# Patient Record
Sex: MALE | Race: BLACK OR AFRICAN AMERICAN | Employment: FULL TIME | URBAN - METROPOLITAN AREA
[De-identification: names, ages, dates, MRNs, and addresses within clinical notes are randomized per-mention and may not be internally consistent; named-entity substitution may affect disease eponyms.]

---

## 2018-01-22 ENCOUNTER — HOSPITAL ENCOUNTER (EMERGENCY)
Age: 23
Discharge: HOME | End: 2018-01-22
Attending: EMERGENCY MEDICINE
Payer: COMMERCIAL

## 2018-01-22 VITALS
TEMPERATURE: 99 F | BODY MASS INDEX: 25.18 KG/M2 | OXYGEN SATURATION: 97 % | RESPIRATION RATE: 16 BRPM | HEART RATE: 89 BPM | SYSTOLIC BLOOD PRESSURE: 139 MMHG | WEIGHT: 190 LBS | DIASTOLIC BLOOD PRESSURE: 79 MMHG | HEIGHT: 73 IN

## 2018-01-22 DIAGNOSIS — S01.81XA FACIAL LACERATION, INITIAL ENCOUNTER: Primary | ICD-10-CM

## 2018-01-22 PROCEDURE — 99283 EMERGENCY DEPT VISIT LOW MDM: CPT

## 2018-01-22 PROCEDURE — 12011 RPR F/E/E/N/L/M 2.5 CM/<: CPT

## 2018-01-22 RX ORDER — MIRTAZAPINE 30 MG/1
30 TABLET, FILM COATED ORAL NIGHTLY
COMMUNITY

## 2018-01-22 ASSESSMENT — PAIN SCALES - GENERAL: PAINLEVEL_OUTOF10: 8
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
## 2020-04-29 ENCOUNTER — TELEPHONE (OUTPATIENT)
Dept: ORTHOPEDIC SURGERY | Age: 25
End: 2020-04-29

## 2020-04-29 NOTE — TELEPHONE ENCOUNTER
Per Dr. Aguilar Pedraza the patient should come into the office on Monday 5/4 for cast removal and x-rays. Tried to call the patient and the patient's mother and got VM. Did call  manuel at [phone] and left a message for her to call me back to schedule an appointment.

## 2020-05-27 ENCOUNTER — HOSPITAL ENCOUNTER (OUTPATIENT)
Age: 25
Setting detail: SPECIMEN
Discharge: HOME | End: 2020-05-27
Payer: MEDICAID

## 2020-05-27 LAB
ABSOLUTE EOS #: 0.29 K/UL (ref 0–0.44)
ABSOLUTE IMMATURE GRANULOCYTE: 0.04 K/UL (ref 0–0.3)
ABSOLUTE LYMPH #: 1.49 K/UL (ref 1.1–3.7)
ABSOLUTE MONO #: 0.76 K/UL (ref 0.1–1.2)
ALBUMIN SERPL-MCNC: 4.4 G/DL (ref 3.5–5.2)
ALBUMIN/GLOBULIN RATIO: 1.4 (ref 1–2.5)
ALP BLD-CCNC: 72 U/L (ref 40–129)
ALT SERPL-CCNC: 36 U/L (ref 5–41)
ANION GAP SERPL CALCULATED.3IONS-SCNC: 22 MMOL/L (ref 9–17)
AST SERPL-CCNC: 23 U/L
BASOPHILS # BLD: 1 % (ref 0–2)
BASOPHILS ABSOLUTE: 0.04 K/UL (ref 0–0.2)
BILIRUB SERPL-MCNC: <0.1 MG/DL (ref 0.3–1.2)
BUN BLDV-MCNC: 10 MG/DL (ref 6–20)
BUN/CREAT BLD: ABNORMAL (ref 9–20)
CALCIUM SERPL-MCNC: 9.5 MG/DL (ref 8.6–10.4)
CHLORIDE BLD-SCNC: 104 MMOL/L (ref 98–107)
CHOLESTEROL/HDL RATIO: 4.4
CHOLESTEROL: 232 MG/DL
CO2: 19 MMOL/L (ref 20–31)
CREAT SERPL-MCNC: 0.79 MG/DL (ref 0.7–1.2)
DIFFERENTIAL TYPE: ABNORMAL
EOSINOPHILS RELATIVE PERCENT: 5 % (ref 1–4)
ESTIMATED AVERAGE GLUCOSE: 134 MG/DL
GFR AFRICAN AMERICAN: >60 ML/MIN
GFR NON-AFRICAN AMERICAN: >60 ML/MIN
GFR SERPL CREATININE-BSD FRML MDRD: ABNORMAL ML/MIN/{1.73_M2}
GFR SERPL CREATININE-BSD FRML MDRD: ABNORMAL ML/MIN/{1.73_M2}
GLUCOSE BLD-MCNC: 87 MG/DL (ref 70–99)
HAV IGM SER IA-ACNC: NONREACTIVE
HBA1C MFR BLD: 6.3 % (ref 4–6)
HCT VFR BLD CALC: 48.3 % (ref 40.7–50.3)
HDLC SERPL-MCNC: 53 MG/DL
HEMOGLOBIN: 15.2 G/DL (ref 13–17)
HEPATITIS B CORE IGM ANTIBODY: NONREACTIVE
HEPATITIS B SURFACE ANTIGEN: NONREACTIVE
HEPATITIS C ANTIBODY: REACTIVE
HIV AG/AB: NONREACTIVE
IMMATURE GRANULOCYTES: 1 %
LDL CHOLESTEROL: 164 MG/DL (ref 0–130)
LYMPHOCYTES # BLD: 25 % (ref 24–43)
MCH RBC QN AUTO: 27.4 PG (ref 25.2–33.5)
MCHC RBC AUTO-ENTMCNC: 31.5 G/DL (ref 28.4–34.8)
MCV RBC AUTO: 87.2 FL (ref 82.6–102.9)
MONOCYTES # BLD: 13 % (ref 3–12)
NRBC AUTOMATED: 0 PER 100 WBC
PDW BLD-RTO: 13.1 % (ref 11.8–14.4)
PLATELET # BLD: 288 K/UL (ref 138–453)
PLATELET ESTIMATE: ABNORMAL
PMV BLD AUTO: 9.7 FL (ref 8.1–13.5)
POTASSIUM SERPL-SCNC: 4.3 MMOL/L (ref 3.7–5.3)
RBC # BLD: 5.54 M/UL (ref 4.21–5.77)
RBC # BLD: ABNORMAL 10*6/UL
SEG NEUTROPHILS: 55 % (ref 36–65)
SEGMENTED NEUTROPHILS ABSOLUTE COUNT: 3.31 K/UL (ref 1.5–8.1)
SODIUM BLD-SCNC: 145 MMOL/L (ref 135–144)
T. PALLIDUM, IGG: NONREACTIVE
TOTAL PROTEIN: 7.6 G/DL (ref 6.4–8.3)
TRIGL SERPL-MCNC: 74 MG/DL
TSH SERPL DL<=0.05 MIU/L-ACNC: 2.22 MIU/L (ref 0.3–5)
VLDLC SERPL CALC-MCNC: ABNORMAL MG/DL (ref 1–30)
WBC # BLD: 5.9 K/UL (ref 3.5–11.3)
WBC # BLD: ABNORMAL 10*3/UL

## 2020-05-28 LAB
C. TRACHOMATIS DNA ,URINE: NEGATIVE
N. GONORRHOEAE DNA, URINE: NEGATIVE
SOURCE: ABNORMAL
SPECIMEN DESCRIPTION: NORMAL
TRICHOMONAS VAGINALI, MOLECULAR: POSITIVE

## 2020-05-29 LAB
DIRECT EXAM: NORMAL
Lab: NORMAL
SPECIMEN DESCRIPTION: NORMAL

## 2020-06-01 LAB
HCV QUANTITATIVE: NORMAL
HEPATITIS C GENOTYPE: NORMAL

## 2020-07-24 ENCOUNTER — HOSPITAL ENCOUNTER (EMERGENCY)
Age: 25
Discharge: HOME | End: 2020-07-24
Attending: EMERGENCY MEDICINE
Payer: MEDICAID

## 2020-07-24 ENCOUNTER — HOSPITAL ENCOUNTER (OUTPATIENT)
Age: 25
Discharge: HOME | End: 2020-07-24
Payer: MEDICAID

## 2020-07-24 VITALS
SYSTOLIC BLOOD PRESSURE: 124 MMHG | HEART RATE: 79 BPM | TEMPERATURE: 97.3 F | WEIGHT: 222 LBS | RESPIRATION RATE: 18 BRPM | OXYGEN SATURATION: 98 % | DIASTOLIC BLOOD PRESSURE: 76 MMHG | HEIGHT: 73 IN | BODY MASS INDEX: 29.42 KG/M2

## 2020-07-24 LAB
-: ABNORMAL
AMORPHOUS: ABNORMAL
BACTERIA: ABNORMAL
BILIRUBIN URINE: NEGATIVE
CASTS UA: ABNORMAL /LPF (ref 0–8)
COLOR: YELLOW
CRYSTALS, UA: ABNORMAL /HPF
EPITHELIAL CELLS UA: ABNORMAL /HPF (ref 0–5)
GLUCOSE URINE: NEGATIVE
HEPATITIS C ANTIBODY: REACTIVE
HIV AG/AB: NONREACTIVE
KETONES, URINE: NEGATIVE
LEUKOCYTE ESTERASE, URINE: ABNORMAL
MUCUS: ABNORMAL
NITRITE, URINE: NEGATIVE
OTHER OBSERVATIONS UA: ABNORMAL
PH UA: 8 (ref 5–8)
PROTEIN UA: NEGATIVE
RBC UA: ABNORMAL /HPF (ref 0–4)
RENAL EPITHELIAL, UA: ABNORMAL /HPF
SPECIFIC GRAVITY UA: 1.02 (ref 1–1.03)
T. PALLIDUM, IGG: NONREACTIVE
TRICHOMONAS: ABNORMAL
TURBIDITY: CLEAR
URINE HGB: NEGATIVE
UROBILINOGEN, URINE: NORMAL
WBC UA: ABNORMAL /HPF (ref 0–5)
YEAST: ABNORMAL

## 2020-07-24 PROCEDURE — 87522 HEPATITIS C REVRS TRNSCRPJ: CPT

## 2020-07-24 PROCEDURE — 86696 HERPES SIMPLEX TYPE 2 TEST: CPT

## 2020-07-24 PROCEDURE — 86694 HERPES SIMPLEX NES ANTBDY: CPT

## 2020-07-24 PROCEDURE — 96372 THER/PROPH/DIAG INJ SC/IM: CPT

## 2020-07-24 PROCEDURE — 36415 COLL VENOUS BLD VENIPUNCTURE: CPT

## 2020-07-24 PROCEDURE — 87491 CHLMYD TRACH DNA AMP PROBE: CPT

## 2020-07-24 PROCEDURE — 87389 HIV-1 AG W/HIV-1&-2 AB AG IA: CPT

## 2020-07-24 PROCEDURE — 87591 N.GONORRHOEAE DNA AMP PROB: CPT

## 2020-07-24 PROCEDURE — 81001 URINALYSIS AUTO W/SCOPE: CPT

## 2020-07-24 PROCEDURE — 6360000002 HC RX W HCPCS: Performed by: STUDENT IN AN ORGANIZED HEALTH CARE EDUCATION/TRAINING PROGRAM

## 2020-07-24 PROCEDURE — 86803 HEPATITIS C AB TEST: CPT

## 2020-07-24 PROCEDURE — 6370000000 HC RX 637 (ALT 250 FOR IP): Performed by: STUDENT IN AN ORGANIZED HEALTH CARE EDUCATION/TRAINING PROGRAM

## 2020-07-24 PROCEDURE — 86695 HERPES SIMPLEX TYPE 1 TEST: CPT

## 2020-07-24 PROCEDURE — 87086 URINE CULTURE/COLONY COUNT: CPT

## 2020-07-24 PROCEDURE — 86780 TREPONEMA PALLIDUM: CPT

## 2020-07-24 PROCEDURE — 99283 EMERGENCY DEPT VISIT LOW MDM: CPT

## 2020-07-24 RX ORDER — CEFTRIAXONE SODIUM 250 MG/1
250 INJECTION, POWDER, FOR SOLUTION INTRAMUSCULAR; INTRAVENOUS ONCE
Status: COMPLETED | OUTPATIENT
Start: 2020-07-24 | End: 2020-07-24

## 2020-07-24 RX ORDER — AZITHROMYCIN 250 MG/1
1000 TABLET, FILM COATED ORAL ONCE
Status: COMPLETED | OUTPATIENT
Start: 2020-07-24 | End: 2020-07-24

## 2020-07-24 RX ADMIN — CEFTRIAXONE SODIUM 250 MG: 250 INJECTION, POWDER, FOR SOLUTION INTRAMUSCULAR; INTRAVENOUS at 12:03

## 2020-07-24 RX ADMIN — AZITHROMYCIN 1000 MG: 250 TABLET, FILM COATED ORAL at 12:03

## 2020-07-24 ASSESSMENT — PAIN DESCRIPTION - DESCRIPTORS: DESCRIPTORS: ACHING;DISCOMFORT

## 2020-07-24 ASSESSMENT — PAIN DESCRIPTION - PROGRESSION: CLINICAL_PROGRESSION: GRADUALLY WORSENING

## 2020-07-24 ASSESSMENT — ENCOUNTER SYMPTOMS
NAUSEA: 0
SORE THROAT: 0
BACK PAIN: 0
COUGH: 0
COLOR CHANGE: 0
ABDOMINAL DISTENTION: 0
CHEST TIGHTNESS: 0
SHORTNESS OF BREATH: 0
WHEEZING: 0
VOMITING: 0
SINUS PRESSURE: 0
ABDOMINAL PAIN: 0
SINUS PAIN: 0
DIARRHEA: 0

## 2020-07-24 ASSESSMENT — PAIN DESCRIPTION - FREQUENCY: FREQUENCY: CONTINUOUS

## 2020-07-24 ASSESSMENT — PAIN DESCRIPTION - ONSET: ONSET: ON-GOING

## 2020-07-24 ASSESSMENT — PAIN DESCRIPTION - ORIENTATION: ORIENTATION: MID;LOWER

## 2020-07-24 ASSESSMENT — PAIN DESCRIPTION - LOCATION: LOCATION: ABDOMEN;GROIN

## 2020-07-24 ASSESSMENT — PAIN SCALES - GENERAL: PAINLEVEL_OUTOF10: 7
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
## 2020-07-25 LAB
CULTURE: NORMAL
Lab: NORMAL
SPECIMEN DESCRIPTION: NORMAL

## 2020-07-26 LAB
C. TRACHOMATIS DNA ,URINE: ABNORMAL
N. GONORRHOEAE DNA, URINE: NEGATIVE
SPECIMEN DESCRIPTION: ABNORMAL

## 2020-07-28 LAB
HERPES SIMPLEX VIRUS 1 IGG: 0.2
HERPES SIMPLEX VIRUS 2 IGG: 0.03
HERPES TYPE 1/2 IGM COMBINED: 0.23

## 2020-07-29 LAB
DIRECT EXAM: NORMAL
Lab: NORMAL
SPECIMEN DESCRIPTION: NORMAL

## 2021-05-21 ENCOUNTER — HOSPITAL ENCOUNTER (EMERGENCY)
Age: 26
Discharge: HOME | End: 2021-05-21
Attending: EMERGENCY MEDICINE
Payer: MEDICAID

## 2021-05-21 VITALS
RESPIRATION RATE: 18 BRPM | TEMPERATURE: 96.8 F | BODY MASS INDEX: 28.49 KG/M2 | SYSTOLIC BLOOD PRESSURE: 118 MMHG | HEIGHT: 73 IN | HEART RATE: 73 BPM | DIASTOLIC BLOOD PRESSURE: 83 MMHG | WEIGHT: 215 LBS | OXYGEN SATURATION: 100 %

## 2021-05-21 DIAGNOSIS — Z20.2 EXPOSURE TO CHLAMYDIA: Primary | ICD-10-CM

## 2021-05-21 PROCEDURE — 99283 EMERGENCY DEPT VISIT LOW MDM: CPT

## 2021-05-21 PROCEDURE — 87491 CHLMYD TRACH DNA AMP PROBE: CPT

## 2021-05-21 PROCEDURE — 87591 N.GONORRHOEAE DNA AMP PROB: CPT

## 2021-05-21 PROCEDURE — 6370000000 HC RX 637 (ALT 250 FOR IP): Performed by: STUDENT IN AN ORGANIZED HEALTH CARE EDUCATION/TRAINING PROGRAM

## 2021-05-21 RX ORDER — DOXYCYCLINE HYCLATE 100 MG
100 TABLET ORAL 2 TIMES DAILY
Qty: 14 TABLET | Refills: 0 | Status: SHIPPED | OUTPATIENT
Start: 2021-05-21 | End: 2021-05-28

## 2021-05-21 RX ORDER — DOXYCYCLINE HYCLATE 100 MG
100 TABLET ORAL ONCE
Status: COMPLETED | OUTPATIENT
Start: 2021-05-21 | End: 2021-05-21

## 2021-05-21 RX ADMIN — DOXYCYCLINE HYCLATE 100 MG: 100 TABLET, COATED ORAL at 18:43

## 2021-05-21 NOTE — ED PROVIDER NOTES
[facility]     Emergency Department     Faculty Attestation    I performed a history and physical examination of the patient and discussed management with the resident. I have reviewed and agree with the residents findings including all diagnostic interpretations, and treatment plans as written. Any areas of disagreement are noted on the chart. I was personally present for the key portions of any procedures. I have documented in the chart those procedures where I was not present during the key portions. I have reviewed the emergency nurses triage note. I agree with the chief complaint, past medical history, past surgical history, allergies, medications, social and family history as documented unless otherwise noted below. Documentation of the HPI, Physical Exam and Medical Decision Making performed by [facility]serenity is based on my personal performance of the HPI, PE and MDM. For Physician Assistant/ Nurse Practitioner cases/documentation I have personally evaluated this patient and have completed at least one if not all key elements of the E/M (history, physical exam, and MDM). Additional findings are as noted.         Kwaku Garcia D.O, M.P.H  Attending Emergency Medicine Physician         Kwaku Garcia DO  05/21/21 Susana Garcia
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
## 2021-05-24 LAB
C. TRACHOMATIS DNA ,URINE: NEGATIVE
N. GONORRHOEAE DNA, URINE: NEGATIVE
SPECIMEN DESCRIPTION: NORMAL

## 2021-06-09 ENCOUNTER — HOSPITAL ENCOUNTER (EMERGENCY)
Age: 26
Discharge: HOME | End: 2021-06-09
Attending: EMERGENCY MEDICINE
Payer: MEDICAID

## 2021-06-09 VITALS
BODY MASS INDEX: 27.35 KG/M2 | WEIGHT: 206.4 LBS | HEIGHT: 73 IN | OXYGEN SATURATION: 99 % | DIASTOLIC BLOOD PRESSURE: 79 MMHG | HEART RATE: 78 BPM | RESPIRATION RATE: 16 BRPM | TEMPERATURE: 97.9 F | SYSTOLIC BLOOD PRESSURE: 119 MMHG

## 2021-06-09 DIAGNOSIS — Z20.2 EXPOSURE TO STD: Primary | ICD-10-CM

## 2021-06-09 LAB
BILIRUBIN URINE: NEGATIVE
COLOR: YELLOW
COMMENT UA: NORMAL
GLUCOSE URINE: NEGATIVE
KETONES, URINE: NEGATIVE
LEUKOCYTE ESTERASE, URINE: NEGATIVE
NITRITE, URINE: NEGATIVE
PH UA: 5.5 (ref 5–8)
PROTEIN UA: NEGATIVE
SPECIFIC GRAVITY UA: 1.03 (ref 1–1.03)
TURBIDITY: CLEAR
URINE HGB: NEGATIVE
UROBILINOGEN, URINE: NORMAL

## 2021-06-09 PROCEDURE — 81003 URINALYSIS AUTO W/O SCOPE: CPT

## 2021-06-09 PROCEDURE — 99283 EMERGENCY DEPT VISIT LOW MDM: CPT

## 2021-06-09 PROCEDURE — 87491 CHLMYD TRACH DNA AMP PROBE: CPT

## 2021-06-09 PROCEDURE — 87591 N.GONORRHOEAE DNA AMP PROB: CPT

## 2021-06-09 PROCEDURE — 6360000002 HC RX W HCPCS: Performed by: EMERGENCY MEDICINE

## 2021-06-09 PROCEDURE — 96372 THER/PROPH/DIAG INJ SC/IM: CPT

## 2021-06-09 PROCEDURE — 6370000000 HC RX 637 (ALT 250 FOR IP): Performed by: EMERGENCY MEDICINE

## 2021-06-09 RX ORDER — AZITHROMYCIN 250 MG/1
1000 TABLET, FILM COATED ORAL ONCE
Status: COMPLETED | OUTPATIENT
Start: 2021-06-09 | End: 2021-06-09

## 2021-06-09 RX ORDER — CEFTRIAXONE SODIUM 250 MG/1
250 INJECTION, POWDER, FOR SOLUTION INTRAMUSCULAR; INTRAVENOUS ONCE
Status: COMPLETED | OUTPATIENT
Start: 2021-06-09 | End: 2021-06-09

## 2021-06-09 RX ADMIN — AZITHROMYCIN MONOHYDRATE 1000 MG: 250 TABLET ORAL at 18:17

## 2021-06-09 RX ADMIN — CEFTRIAXONE SODIUM 250 MG: 250 INJECTION, POWDER, FOR SOLUTION INTRAMUSCULAR; INTRAVENOUS at 18:17

## 2021-06-09 ASSESSMENT — PAIN SCALES - GENERAL: PAINLEVEL_OUTOF10: 7
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
## 2021-08-10 ENCOUNTER — HOSPITAL ENCOUNTER (EMERGENCY)
Age: 26
Discharge: HOME | End: 2021-08-10
Attending: EMERGENCY MEDICINE
Payer: MEDICAID

## 2021-08-10 VITALS
OXYGEN SATURATION: 99 % | SYSTOLIC BLOOD PRESSURE: 124 MMHG | TEMPERATURE: 98.2 F | RESPIRATION RATE: 16 BRPM | DIASTOLIC BLOOD PRESSURE: 83 MMHG | HEART RATE: 65 BPM

## 2021-08-10 DIAGNOSIS — R30.0 DYSURIA: Primary | ICD-10-CM

## 2021-08-10 DIAGNOSIS — I88.9 LYMPHADENITIS: ICD-10-CM

## 2021-08-10 LAB
-: NORMAL
ABSOLUTE EOS #: 0.26 K/UL (ref 0–0.44)
ABSOLUTE IMMATURE GRANULOCYTE: <0.03 K/UL (ref 0–0.3)
ABSOLUTE LYMPH #: 1.36 K/UL (ref 1.1–3.7)
ABSOLUTE MONO #: 0.62 K/UL (ref 0.1–1.2)
AMORPHOUS: NORMAL
ANION GAP SERPL CALCULATED.3IONS-SCNC: 8 MMOL/L (ref 9–17)
BACTERIA: NORMAL
BASOPHILS # BLD: 1 % (ref 0–2)
BASOPHILS ABSOLUTE: 0.03 K/UL (ref 0–0.2)
BILIRUBIN URINE: NEGATIVE
BUN BLDV-MCNC: 8 MG/DL (ref 6–20)
BUN/CREAT BLD: ABNORMAL (ref 9–20)
CALCIUM SERPL-MCNC: 9.2 MG/DL (ref 8.6–10.4)
CASTS UA: NORMAL /LPF (ref 0–8)
CHLORIDE BLD-SCNC: 105 MMOL/L (ref 98–107)
CO2: 28 MMOL/L (ref 20–31)
COLOR: YELLOW
CREAT SERPL-MCNC: 0.97 MG/DL (ref 0.7–1.2)
CRYSTALS, UA: NORMAL /HPF
DIFFERENTIAL TYPE: ABNORMAL
EOSINOPHILS RELATIVE PERCENT: 5 % (ref 1–4)
EPITHELIAL CELLS UA: NORMAL /HPF (ref 0–5)
GFR AFRICAN AMERICAN: >60 ML/MIN
GFR NON-AFRICAN AMERICAN: >60 ML/MIN
GFR SERPL CREATININE-BSD FRML MDRD: ABNORMAL ML/MIN/{1.73_M2}
GFR SERPL CREATININE-BSD FRML MDRD: ABNORMAL ML/MIN/{1.73_M2}
GLUCOSE BLD-MCNC: 102 MG/DL (ref 70–99)
GLUCOSE URINE: NEGATIVE
HCT VFR BLD CALC: 49.8 % (ref 40.7–50.3)
HEMOGLOBIN: 15.7 G/DL (ref 13–17)
IMMATURE GRANULOCYTES: 0 %
KETONES, URINE: NEGATIVE
LEUKOCYTE ESTERASE, URINE: NEGATIVE
LYMPHOCYTES # BLD: 25 % (ref 24–43)
MCH RBC QN AUTO: 27 PG (ref 25.2–33.5)
MCHC RBC AUTO-ENTMCNC: 31.5 G/DL (ref 28.4–34.8)
MCV RBC AUTO: 85.6 FL (ref 82.6–102.9)
MONOCYTES # BLD: 11 % (ref 3–12)
MUCUS: NORMAL
NITRITE, URINE: NEGATIVE
NRBC AUTOMATED: 0 PER 100 WBC
OTHER OBSERVATIONS UA: NORMAL
PDW BLD-RTO: 13.7 % (ref 11.8–14.4)
PH UA: 5.5 (ref 5–8)
PLATELET # BLD: 253 K/UL (ref 138–453)
PLATELET ESTIMATE: ABNORMAL
PMV BLD AUTO: 9.4 FL (ref 8.1–13.5)
POTASSIUM SERPL-SCNC: 4.4 MMOL/L (ref 3.7–5.3)
PROTEIN UA: NEGATIVE
RBC # BLD: 5.82 M/UL (ref 4.21–5.77)
RBC # BLD: ABNORMAL 10*6/UL
RBC UA: NORMAL /HPF (ref 0–4)
RENAL EPITHELIAL, UA: NORMAL /HPF
SEG NEUTROPHILS: 58 % (ref 36–65)
SEGMENTED NEUTROPHILS ABSOLUTE COUNT: 3.15 K/UL (ref 1.5–8.1)
SODIUM BLD-SCNC: 141 MMOL/L (ref 135–144)
SPECIFIC GRAVITY UA: 1.02 (ref 1–1.03)
TRICHOMONAS: NORMAL
TURBIDITY: CLEAR
URINE HGB: NEGATIVE
UROBILINOGEN, URINE: NORMAL
WBC # BLD: 5.4 K/UL (ref 3.5–11.3)
WBC # BLD: ABNORMAL 10*3/UL
WBC UA: NORMAL /HPF (ref 0–5)
YEAST: NORMAL

## 2021-08-10 PROCEDURE — 81001 URINALYSIS AUTO W/SCOPE: CPT

## 2021-08-10 PROCEDURE — 87086 URINE CULTURE/COLONY COUNT: CPT

## 2021-08-10 PROCEDURE — 85025 COMPLETE CBC W/AUTO DIFF WBC: CPT

## 2021-08-10 PROCEDURE — 87491 CHLMYD TRACH DNA AMP PROBE: CPT

## 2021-08-10 PROCEDURE — 6370000000 HC RX 637 (ALT 250 FOR IP): Performed by: STUDENT IN AN ORGANIZED HEALTH CARE EDUCATION/TRAINING PROGRAM

## 2021-08-10 PROCEDURE — 80048 BASIC METABOLIC PNL TOTAL CA: CPT

## 2021-08-10 PROCEDURE — 99284 EMERGENCY DEPT VISIT MOD MDM: CPT

## 2021-08-10 PROCEDURE — 87591 N.GONORRHOEAE DNA AMP PROB: CPT

## 2021-08-10 RX ORDER — DOXYCYCLINE HYCLATE 100 MG
100 TABLET ORAL ONCE
Status: COMPLETED | OUTPATIENT
Start: 2021-08-10 | End: 2021-08-10

## 2021-08-10 RX ORDER — ACETAMINOPHEN 500 MG
1000 TABLET ORAL 3 TIMES DAILY
Qty: 42 TABLET | Refills: 0 | Status: ON HOLD | OUTPATIENT
Start: 2021-08-10 | End: 2022-01-19 | Stop reason: HOSPADM

## 2021-08-10 RX ORDER — DOXYCYCLINE HYCLATE 100 MG
100 TABLET ORAL 2 TIMES DAILY
Qty: 14 TABLET | Refills: 0 | Status: SHIPPED | OUTPATIENT
Start: 2021-08-10 | End: 2021-08-17

## 2021-08-10 RX ADMIN — DOXYCYCLINE HYCLATE 100 MG: 100 TABLET, COATED ORAL at 13:05

## 2021-08-10 ASSESSMENT — ENCOUNTER SYMPTOMS
SORE THROAT: 1
TROUBLE SWALLOWING: 1
COUGH: 0
SHORTNESS OF BREATH: 0
NAUSEA: 0
VOMITING: 0
ABDOMINAL PAIN: 0
RHINORRHEA: 0

## 2021-08-10 ASSESSMENT — PAIN SCALES - GENERAL: PAINLEVEL_OUTOF10: 9

## 2021-08-10 ASSESSMENT — PAIN DESCRIPTION - FREQUENCY: FREQUENCY: CONTINUOUS

## 2021-08-10 NOTE — ED PROVIDER NOTES
[address] [facility]     Emergency Department     Faculty Note/ Attestation      Pt Name: Jose Jeronimo                                       MRN: [id number]  [place] 1995  Date of evaluation: 8/10/2021  Patients PCP:    No primary care provider on file. Attestation  I performed a history and physical examination of the patient/ or directly observed  and discussed management with the resident. I reviewed the residents note and agree with the documented findings and plan of care. Any areas of disagreement are noted on the chart. I was personally present for the key portions of any procedures. I have documented in the chart those procedures where I was not present during the key portions. I have reviewed the emergency nurses triage note. I agree with the chief complaint, past medical history, past surgical history, allergies, medications, social and family history as documented unless otherwise noted below. For Physician Assistant/ Nurse Practitioner cases/documentation I have personally evaluated this patient and have completed at least one if not all key elements of the E/M (history, physical exam, and MDM). Additional findings are as noted. This patient was evaluated in the Emergency Department for symptoms described in the history of present illness. The patient was evaluated in the context of the global COVID-19 pandemic, which necessitated consideration that the patient might be at risk for infection with the SARS-CoV-2 virus that causes COVID-19. Institutional protocols and algorithms that pertain to the evaluation of patients at risk for COVID-19 are in a state of rapid change based on information released by regulatory bodies including the CDC and federal and state organizations. These policies and algorithms were followed during the patient's care in the ED.      Initial Screens:             Vitals:    Vitals:    08/10/21 1106 08/10/21 1107   BP:  124/83   Pulse: 65    Temp: 98.2 °F (36.8 °C)    TempSrc: Oral        Chief Complaint      Chief Complaint   Patient presents with    Dysuria          oral temperature is 98.2 °F (36.8 °C). His blood pressure is 124/83 and his pulse is 65. DIAGNOSTIC RESULTS       RADIOLOGY:   No orders to display         LABS:  Labs Reviewed   C.TRACHOMATIS N.GONORRHOEAE DNA, URINE   CULTURE, URINE   URINALYSIS WITH MICROSCOPIC   CBC WITH AUTO DIFFERENTIAL   BASIC METABOLIC PANEL         EMERGENCY DEPARTMENT COURSE:     -------------------------      BP: 124/83, Temp: 98.2 °F (36.8 °C), Pulse: 65,      System Problem List     Patient Active Problem List   Diagnosis    Major depressive disorder, recurrent episode, severe (HCC)    Cannabis abuse         Comments  Chronic Prob List noted          Valdo Garrett MD,, MD, F.A.C.E.P.   Attending Emergency Physician         Valdo Garrett MD  08/10/21 7025

## 2021-08-10 NOTE — ED PROVIDER NOTES
[facility]  Emergency Department Encounter  Emergency Medicine Resident     Pt Name: Felipe Nugent  MRN: [id number]  [place] 1995  Date of evaluation: 8/10/21  PCP:  No primary care provider on file. CHIEF COMPLAINT       Chief Complaint   Patient presents with    Dysuria       HISTORY OF PRESENT ILLNESS  (Location/Symptom, Timing/Onset, Context/Setting, Quality, Duration, Modifying Factors, Severity.)    Felipe Nugent is a 27 y.o. male who presents with dysuria and also right-sided neck pain. Patient states that dysuria is been going off and on again for the past month. A month ago he was tested and treated for gonorrhea. Tested negative for chlamydia. Was given 500 mg of IM ceftriaxone in addition to 1 g of azithromycin at the time. States that he has not had sex with the person he believes gave him the STD since he has been treated. Does have some intermittent dysuria and some left-sided flank pain but states he does have some bilateral flank pain. States he has a history of kidney stones but this is not feel like a kidney stone to him. Also has some right-sided neck pain, states he has a swollen lymph node that he noticed yesterday morning got worse yesterday and then has begun to improve. States that it was painful for him to swallow last night but has been improving. Denies any fever or chills. Denies any nausea or vomiting. Denies any current difficulty swallowing. PPE Worn:  Gloves: Yes  Eye Protection: Goggles  Mask: Surgical Mask  Gown: NO    PAST MEDICAL / SURGICAL / SOCIAL / FAMILY HISTORY    has a past medical history of Asthma, Hepatitis C, Kidney stone, Major depression, and Marijuana abuse.     has no past surgical history on file.     Social History     Socioeconomic History    Marital status: Single     Spouse name: Not on file    Number of children: Not on file    Years of education: Not on file    Highest education level: Not on file   Occupational History    Not on file   Tobacco Use    Smoking status: Never Smoker    Smokeless tobacco: Never Used   Vaping Use    Vaping Use: Never used   Substance and Sexual Activity    Alcohol use: No    Drug use: Yes     Frequency: 7.0 times per week     Types: Marijuana    Sexual activity: Yes     Partners: Female   Other Topics Concern    Not on file   Social History Narrative    Not on file     Social Determinants of Health     Financial Resource Strain:     Difficulty of Paying Living Expenses:    Food Insecurity:     Worried About Running Out of Food in the Last Year:     Ran Out of Food in the Last Year:    Transportation Needs:     Lack of Transportation (Medical):  Lack of Transportation (Non-Medical):    Physical Activity:     Days of Exercise per Week:     Minutes of Exercise per Session:    Stress:     Feeling of Stress :    Social Connections:     Frequency of Communication with Friends and Family:     Frequency of Social Gatherings with Friends and Family:     Attends Shinto Services:     Active Member of Clubs or Organizations:     Attends Club or Organization Meetings:     Marital Status:    Intimate Partner Violence:     Fear of Current or Ex-Partner:     Emotionally Abused:     Physically Abused:     Sexually Abused:        No family history on file. Allergies:    Patient has no known allergies. Home Medications:  Prior to Admission medications    Medication Sig Start Date End Date Taking?  Authorizing Provider   doxycycline hyclate (VIBRA-TABS) 100 MG tablet Take 1 tablet by mouth 2 times daily for 7 days 8/10/21 8/17/21 Yes Claire Cho MD   acetaminophen (TYLENOL) 500 MG tablet Take 2 tablets by mouth 3 times daily for 7 days 8/10/21 8/17/21 Yes Claire Cho MD   sertraline (ZOLOFT) 50 MG tablet Take 50 mg by mouth every evening    Historical Provider, MD   mirtazapine (REMERON) 30 MG tablet Take 30 mg by mouth nightly    Historical Provider, MD REVIEW OF SYSTEMS    (2-9 systems for level 4, 10 or more for level 5)    Review of Systems   Constitutional: Negative for chills, fatigue and fever. HENT: Positive for sore throat and trouble swallowing. Negative for congestion and rhinorrhea. Respiratory: Negative for cough and shortness of breath. Cardiovascular: Negative for chest pain. Gastrointestinal: Negative for abdominal pain, nausea and vomiting. Genitourinary: Positive for dysuria and flank pain. Neurological: Negative for light-headedness and headaches. All other systems reviewed and are negative. PHYSICAL EXAM   (up to 7 for level 4, 8 or more for level 5)    INITIAL VITALS:   ED Triage Vitals   BP Temp Temp Source Pulse Resp SpO2 Height Weight   08/10/21 1107 08/10/21 1106 08/10/21 1106 08/10/21 1106 -- -- -- --   124/83 98.2 °F (36.8 °C) Oral 65           Physical Exam  Vitals and nursing note reviewed. Exam conducted with a chaperone present. Constitutional:       General: He is not in acute distress. Appearance: Normal appearance. He is not ill-appearing. HENT:      Nose: No congestion or rhinorrhea. Mouth/Throat:      Mouth: Mucous membranes are moist.      Dentition: Normal dentition. No dental tenderness. Tongue: No lesions. Pharynx: Oropharynx is clear. Uvula midline. No pharyngeal swelling, oropharyngeal exudate or posterior oropharyngeal erythema. Tonsils: No tonsillar exudate. 0 on the right. 0 on the left. Comments: No sublingual tenderness. No dental tenderness. Sublingual area is not raised, no woody sensation. Eyes:      Extraocular Movements: Extraocular movements intact. Pupils: Pupils are equal, round, and reactive to light. Neck:      Thyroid: No thyroid tenderness. Comments: Tenderness to the indicated area  Cardiovascular:      Rate and Rhythm: Normal rate and regular rhythm. Pulses: Normal pulses.            Radial pulses are 2+ on the right side and 2+ on the left side. Heart sounds: Normal heart sounds. No murmur heard. Pulmonary:      Effort: Pulmonary effort is normal. No respiratory distress. Breath sounds: Normal breath sounds. No decreased breath sounds. Abdominal:      Palpations: Abdomen is soft. Tenderness: There is no abdominal tenderness. There is no right CVA tenderness or left CVA tenderness. Genitourinary:     Penis: Normal.       Testes: Normal.   Musculoskeletal:      Cervical back: Normal range of motion and neck supple. No spinous process tenderness or muscular tenderness. Lymphadenopathy:      Cervical: Cervical adenopathy present. Skin:     General: Skin is warm and dry. Capillary Refill: Capillary refill takes less than 2 seconds. Neurological:      General: No focal deficit present. Mental Status: He is alert and oriented to person, place, and time. Psychiatric:         Behavior: Behavior is cooperative.          DIFFERENTIAL  DIAGNOSIS   PLAN (LABS / IMAGING / EKG):  Orders Placed This Encounter   Procedures    C.trachomatis N.gonorrhoeae DNA, Urine    Culture, Urine    Urinalysis with microscopic    CBC WITH AUTO DIFFERENTIAL    BASIC METABOLIC PANEL       MEDICATIONS ORDERED:  Orders Placed This Encounter   Medications    doxycycline hyclate (VIBRA-TABS) tablet 100 mg     Order Specific Question:   Antimicrobial Indications     Answer:   Head and Neck Infection     Order Specific Question:   Antimicrobial Indications     Answer:   STD infection    doxycycline hyclate (VIBRA-TABS) 100 MG tablet     Sig: Take 1 tablet by mouth 2 times daily for 7 days     Dispense:  14 tablet     Refill:  0    acetaminophen (TYLENOL) 500 MG tablet     Sig: Take 2 tablets by mouth 3 times daily for 7 days     Dispense:  42 tablet     Refill:  0         DIAGNOSTIC RESULTS / EMERGENCYDEPARTMENT COURSE / MDM   LABS:  Labs Reviewed   CBC WITH AUTO DIFFERENTIAL - Abnormal; Notable for the following components: Result Value    RBC 5.82 (*)     Eosinophils % 5 (*)     All other components within normal limits   BASIC METABOLIC PANEL - Abnormal; Notable for the following components:    Glucose 102 (*)     Anion Gap 8 (*)     All other components within normal limits   C.TRACHOMATIS N.GONORRHOEAE DNA, URINE   CULTURE, URINE   URINALYSIS WITH MICROSCOPIC       RADIOLOGY:  No results found. Impression:  States he had bilateral flank pain however none appreciated on exam.  Does have some tenderness along the right submandibular area, possible lymphadenitis, will need to do ultrasound to see if this is an abscess however. No uvula swelling or lesions. Uvula midline. Tonsils not enlarged. Sublingual area is not tender. Patient has no muffled voice, no hot potato voice, is not drooling. He is controlling his airway and secretions well. Does not appear to be in any acute distress. States he had intermittent dysuria. No discharge seen on exam, no penile or testicular tenderness. No lesions seen. Patient also denies any joint pain, denies any eye pain or any eye discharge. No skin lesions were seen on any exposed skin. Doubt patient has any disseminated gonococcal infection however concern for possible lymphadenitis       EMERGENCY DEPARTMENT COURSE:   Ultrasound demonstrated enlarged lymph node but no signs of abscess, no signs of cobblestoning. Urinalysis not indicative of any infection. However given concern for the enlarged lymph node, will treat with doxycycline. Did send off urine analysis, urine culture, gonorrhea and Chlamydia testing as well.     MDM  Number of Diagnoses or Management Options  Dysuria: new, needed workup  Lymphadenitis: new, needed workup     Amount and/or Complexity of Data Reviewed  Clinical lab tests: ordered and reviewed  Tests in the radiology section of CPT®: ordered and reviewed  Discuss the patient with other providers: yes  Independent visualization of images, tracings, or specimens: yes    Risk of Complications, Morbidity, and/or Mortality  Presenting problems: low  Diagnostic procedures: low  Management options: low    Patient Progress  Patient progress: stable      PROCEDURES:  SOFT TISSUE ULTRASOUND:   A limited bedside ultrasound of the soft tissue was performed. The purpose of this study was to evaluate for fluid collection concerning for abscess. The structures studied was the skin and underlying soft tissue. FINDINGS:  Study was Negative for  soft tissue fluid collection. Incision and drainage was was not indicated. The study was technically adequate. IMAGES:  Electronically uploaded to the PACS system      CONSULTS:  None    CRITICAL CARE:  Please see attending note    FINAL IMPRESSION     1. Dysuria    2. Lymphadenitis          DISPOSITION / PLAN   DISPOSITION Decision To Discharge 08/10/2021 12:48:46 PM      Evaluation and treatment course in the ED, and plan of care upon discharge was discussed in length with the patient. Patient had no further questions prior to being discharged and was instructed to return to the ED for new or worsening symptoms. Any changes to existing medications or new prescriptions were reviewed with patient and they expressed understanding of how to correctly take their medications and the possible side effects.     PATIENT REFERRED TO:  [facility] AT [address] [address] [phone]  Schedule an appointment as soon as possible for a visit in 1 week      [address] [facility] [address] [phone]  Schedule an appointment as soon as possible for a visit in 1 week      [facility]  [address] [facility]  [phone]    If symptoms worsen      DISCHARGE MEDICATIONS:  New Prescriptions    ACETAMINOPHEN (TYLENOL) 500 MG TABLET    Take 2 tablets by mouth 3 times daily for 7 days DOXYCYCLINE HYCLATE (VIBRA-TABS) 100 MG TABLET    Take 1 tablet by mouth 2 times daily for 7 days       Sean Velazquez DO  Emergency Medicine Resident Physician, PGY-3    (Please note that portions of this note were completed with a voice recognition program.  Efforts were made to edit the dictations but occasionally words are mis-transcribed.)          Sean Velazquez MD  08/10/21 1394

## 2021-08-11 LAB
C. TRACHOMATIS DNA ,URINE: NEGATIVE
CULTURE: NO GROWTH
Lab: NORMAL
N. GONORRHOEAE DNA, URINE: NEGATIVE
SPECIMEN DESCRIPTION: NORMAL
SPECIMEN DESCRIPTION: NORMAL

## 2022-01-16 ENCOUNTER — HOSPITAL ENCOUNTER (INPATIENT)
Age: 27
LOS: 3 days | Discharge: HOME | DRG: 174 | End: 2022-01-19
Attending: EMERGENCY MEDICINE | Admitting: INTERNAL MEDICINE
Payer: MEDICAID

## 2022-01-16 ENCOUNTER — APPOINTMENT (OUTPATIENT)
Dept: GENERAL RADIOLOGY | Age: 27
DRG: 174 | End: 2022-01-16
Payer: MEDICAID

## 2022-01-16 DIAGNOSIS — I21.4 NON-ST ELEVATION MI (NSTEMI) (HCC): ICD-10-CM

## 2022-01-16 DIAGNOSIS — R07.9 CHEST PAIN, UNSPECIFIED TYPE: Primary | ICD-10-CM

## 2022-01-16 PROBLEM — I24.9 ACS (ACUTE CORONARY SYNDROME) (HCC): Status: ACTIVE | Noted: 2022-01-16

## 2022-01-16 PROBLEM — F12.20 TETRAHYDROCANNABINOL (THC) DEPENDENCE (HCC): Status: ACTIVE | Noted: 2022-01-16

## 2022-01-16 LAB
ABSOLUTE EOS #: 0.07 K/UL (ref 0–0.44)
ABSOLUTE IMMATURE GRANULOCYTE: <0.03 K/UL (ref 0–0.3)
ABSOLUTE LYMPH #: 1.71 K/UL (ref 1.1–3.7)
ABSOLUTE MONO #: 0.66 K/UL (ref 0.1–1.2)
AMPHETAMINE SCREEN URINE: NEGATIVE
ANION GAP SERPL CALCULATED.3IONS-SCNC: 12 MMOL/L (ref 9–17)
BARBITURATE SCREEN URINE: NEGATIVE
BASOPHILS # BLD: 0 % (ref 0–2)
BASOPHILS ABSOLUTE: <0.03 K/UL (ref 0–0.2)
BENZODIAZEPINE SCREEN, URINE: NEGATIVE
BNP INTERPRETATION: NORMAL
BUN BLDV-MCNC: 12 MG/DL (ref 6–20)
BUN/CREAT BLD: ABNORMAL (ref 9–20)
BUPRENORPHINE URINE: ABNORMAL
CALCIUM SERPL-MCNC: 9.3 MG/DL (ref 8.6–10.4)
CANNABINOID SCREEN URINE: POSITIVE
CHLORIDE BLD-SCNC: 101 MMOL/L (ref 98–107)
CO2: 26 MMOL/L (ref 20–31)
COCAINE METABOLITE, URINE: NEGATIVE
CREAT SERPL-MCNC: 0.86 MG/DL (ref 0.7–1.2)
DIFFERENTIAL TYPE: ABNORMAL
EOSINOPHILS RELATIVE PERCENT: 1 % (ref 1–4)
GFR AFRICAN AMERICAN: >60 ML/MIN
GFR NON-AFRICAN AMERICAN: >60 ML/MIN
GFR SERPL CREATININE-BSD FRML MDRD: ABNORMAL ML/MIN/{1.73_M2}
GFR SERPL CREATININE-BSD FRML MDRD: ABNORMAL ML/MIN/{1.73_M2}
GLUCOSE BLD-MCNC: 100 MG/DL (ref 70–99)
HCT VFR BLD CALC: 45.2 % (ref 40.7–50.3)
HCT VFR BLD CALC: 46.3 % (ref 40.7–50.3)
HEMOGLOBIN: 14.7 G/DL (ref 13–17)
HEMOGLOBIN: 14.8 G/DL (ref 13–17)
IMMATURE GRANULOCYTES: 0 %
INR BLD: 0.9
LIPASE: 23 U/L (ref 13–60)
LYMPHOCYTES # BLD: 20 % (ref 24–43)
MCH RBC QN AUTO: 27.1 PG (ref 25.2–33.5)
MCH RBC QN AUTO: 27.5 PG (ref 25.2–33.5)
MCHC RBC AUTO-ENTMCNC: 32 G/DL (ref 28.4–34.8)
MCHC RBC AUTO-ENTMCNC: 32.5 G/DL (ref 28.4–34.8)
MCV RBC AUTO: 84.5 FL (ref 82.6–102.9)
MCV RBC AUTO: 84.6 FL (ref 82.6–102.9)
MDMA URINE: ABNORMAL
METHADONE SCREEN, URINE: NEGATIVE
METHAMPHETAMINE, URINE: ABNORMAL
MONOCYTES # BLD: 8 % (ref 3–12)
NRBC AUTOMATED: 0 PER 100 WBC
NRBC AUTOMATED: 0 PER 100 WBC
OPIATES, URINE: NEGATIVE
OXYCODONE SCREEN URINE: NEGATIVE
PARTIAL THROMBOPLASTIN TIME: 24.5 SEC (ref 20.5–30.5)
PDW BLD-RTO: 12.7 % (ref 11.8–14.4)
PDW BLD-RTO: 12.7 % (ref 11.8–14.4)
PHENCYCLIDINE, URINE: NEGATIVE
PLATELET # BLD: 313 K/UL (ref 138–453)
PLATELET # BLD: 315 K/UL (ref 138–453)
PLATELET ESTIMATE: ABNORMAL
PMV BLD AUTO: 9.3 FL (ref 8.1–13.5)
PMV BLD AUTO: 9.3 FL (ref 8.1–13.5)
POTASSIUM SERPL-SCNC: 3.9 MMOL/L (ref 3.7–5.3)
PRO-BNP: 83 PG/ML
PROPOXYPHENE, URINE: ABNORMAL
PROTHROMBIN TIME: 10.2 SEC (ref 9.1–12.3)
RBC # BLD: 5.35 M/UL (ref 4.21–5.77)
RBC # BLD: 5.47 M/UL (ref 4.21–5.77)
RBC # BLD: ABNORMAL 10*6/UL
SARS-COV-2, RAPID: NOT DETECTED
SEG NEUTROPHILS: 71 % (ref 36–65)
SEGMENTED NEUTROPHILS ABSOLUTE COUNT: 5.9 K/UL (ref 1.5–8.1)
SODIUM BLD-SCNC: 139 MMOL/L (ref 135–144)
SPECIMEN DESCRIPTION: NORMAL
TEST INFORMATION: ABNORMAL
TOTAL CK: 293 U/L (ref 39–308)
TRICYCLIC ANTIDEPRESSANTS, UR: ABNORMAL
TROPONIN INTERP: ABNORMAL
TROPONIN T: ABNORMAL NG/ML
TROPONIN, HIGH SENSITIVITY: 118 NG/L (ref 0–22)
TROPONIN, HIGH SENSITIVITY: 133 NG/L (ref 0–22)
TROPONIN, HIGH SENSITIVITY: 50 NG/L (ref 0–22)
TROPONIN, HIGH SENSITIVITY: 58 NG/L (ref 0–22)
WBC # BLD: 8.4 K/UL (ref 3.5–11.3)
WBC # BLD: 9.3 K/UL (ref 3.5–11.3)
WBC # BLD: ABNORMAL 10*3/UL

## 2022-01-16 PROCEDURE — 85025 COMPLETE CBC W/AUTO DIFF WBC: CPT

## 2022-01-16 PROCEDURE — 6360000002 HC RX W HCPCS: Performed by: PEDIATRICS

## 2022-01-16 PROCEDURE — 71045 X-RAY EXAM CHEST 1 VIEW: CPT

## 2022-01-16 PROCEDURE — 83690 ASSAY OF LIPASE: CPT

## 2022-01-16 PROCEDURE — 6370000000 HC RX 637 (ALT 250 FOR IP): Performed by: STUDENT IN AN ORGANIZED HEALTH CARE EDUCATION/TRAINING PROGRAM

## 2022-01-16 PROCEDURE — 36415 COLL VENOUS BLD VENIPUNCTURE: CPT

## 2022-01-16 PROCEDURE — 83880 ASSAY OF NATRIURETIC PEPTIDE: CPT

## 2022-01-16 PROCEDURE — 85730 THROMBOPLASTIN TIME PARTIAL: CPT

## 2022-01-16 PROCEDURE — 85610 PROTHROMBIN TIME: CPT

## 2022-01-16 PROCEDURE — 6370000000 HC RX 637 (ALT 250 FOR IP): Performed by: PEDIATRICS

## 2022-01-16 PROCEDURE — 84484 ASSAY OF TROPONIN QUANT: CPT

## 2022-01-16 PROCEDURE — 6370000000 HC RX 637 (ALT 250 FOR IP): Performed by: NURSE PRACTITIONER

## 2022-01-16 PROCEDURE — 93005 ELECTROCARDIOGRAM TRACING: CPT | Performed by: PEDIATRICS

## 2022-01-16 PROCEDURE — 6360000002 HC RX W HCPCS: Performed by: STUDENT IN AN ORGANIZED HEALTH CARE EDUCATION/TRAINING PROGRAM

## 2022-01-16 PROCEDURE — 2580000003 HC RX 258: Performed by: PEDIATRICS

## 2022-01-16 PROCEDURE — 99285 EMERGENCY DEPT VISIT HI MDM: CPT

## 2022-01-16 PROCEDURE — 82550 ASSAY OF CK (CPK): CPT

## 2022-01-16 PROCEDURE — 80048 BASIC METABOLIC PNL TOTAL CA: CPT

## 2022-01-16 PROCEDURE — 87635 SARS-COV-2 COVID-19 AMP PRB: CPT

## 2022-01-16 PROCEDURE — 99222 1ST HOSP IP/OBS MODERATE 55: CPT | Performed by: NURSE PRACTITIONER

## 2022-01-16 PROCEDURE — 85027 COMPLETE CBC AUTOMATED: CPT

## 2022-01-16 PROCEDURE — 80307 DRUG TEST PRSMV CHEM ANLYZR: CPT

## 2022-01-16 PROCEDURE — 2060000000 HC ICU INTERMEDIATE R&B

## 2022-01-16 RX ORDER — ACETAMINOPHEN 650 MG/1
650 SUPPOSITORY RECTAL EVERY 6 HOURS PRN
Status: DISCONTINUED | OUTPATIENT
Start: 2022-01-16 | End: 2022-01-19 | Stop reason: HOSPADM

## 2022-01-16 RX ORDER — SODIUM CHLORIDE 0.9 % (FLUSH) 0.9 %
5-40 SYRINGE (ML) INJECTION EVERY 12 HOURS SCHEDULED
Status: DISCONTINUED | OUTPATIENT
Start: 2022-01-16 | End: 2022-01-19 | Stop reason: HOSPADM

## 2022-01-16 RX ORDER — SODIUM CHLORIDE 9 MG/ML
INJECTION, SOLUTION INTRAVENOUS CONTINUOUS
Status: DISCONTINUED | OUTPATIENT
Start: 2022-01-16 | End: 2022-01-19 | Stop reason: HOSPADM

## 2022-01-16 RX ORDER — HEPARIN SODIUM 1000 [USP'U]/ML
2000 INJECTION, SOLUTION INTRAVENOUS; SUBCUTANEOUS PRN
Status: DISCONTINUED | OUTPATIENT
Start: 2022-01-16 | End: 2022-01-17

## 2022-01-16 RX ORDER — NITROGLYCERIN 0.4 MG/1
0.4 TABLET SUBLINGUAL EVERY 5 MIN PRN
Status: DISCONTINUED | OUTPATIENT
Start: 2022-01-16 | End: 2022-01-19 | Stop reason: HOSPADM

## 2022-01-16 RX ORDER — ASPIRIN 81 MG/1
81 TABLET, CHEWABLE ORAL DAILY
Status: DISCONTINUED | OUTPATIENT
Start: 2022-01-16 | End: 2022-01-17

## 2022-01-16 RX ORDER — SODIUM CHLORIDE 9 MG/ML
25 INJECTION, SOLUTION INTRAVENOUS PRN
Status: DISCONTINUED | OUTPATIENT
Start: 2022-01-16 | End: 2022-01-19 | Stop reason: HOSPADM

## 2022-01-16 RX ORDER — ATORVASTATIN CALCIUM 80 MG/1
80 TABLET, FILM COATED ORAL NIGHTLY
Status: DISCONTINUED | OUTPATIENT
Start: 2022-01-16 | End: 2022-01-17

## 2022-01-16 RX ORDER — POLYETHYLENE GLYCOL 3350 17 G/17G
17 POWDER, FOR SOLUTION ORAL DAILY PRN
Status: DISCONTINUED | OUTPATIENT
Start: 2022-01-16 | End: 2022-01-19 | Stop reason: HOSPADM

## 2022-01-16 RX ORDER — SODIUM CHLORIDE 0.9 % (FLUSH) 0.9 %
5-40 SYRINGE (ML) INJECTION PRN
Status: DISCONTINUED | OUTPATIENT
Start: 2022-01-16 | End: 2022-01-19 | Stop reason: HOSPADM

## 2022-01-16 RX ORDER — ACETAMINOPHEN 325 MG/1
650 TABLET ORAL EVERY 6 HOURS PRN
Status: DISCONTINUED | OUTPATIENT
Start: 2022-01-16 | End: 2022-01-19 | Stop reason: HOSPADM

## 2022-01-16 RX ORDER — IBUPROFEN 400 MG/1
600 TABLET ORAL
Status: DISCONTINUED | OUTPATIENT
Start: 2022-01-16 | End: 2022-01-16

## 2022-01-16 RX ORDER — POTASSIUM CHLORIDE 7.45 MG/ML
10 INJECTION INTRAVENOUS PRN
Status: DISCONTINUED | OUTPATIENT
Start: 2022-01-16 | End: 2022-01-19 | Stop reason: HOSPADM

## 2022-01-16 RX ORDER — HEPARIN SODIUM 10000 [USP'U]/100ML
5-30 INJECTION, SOLUTION INTRAVENOUS CONTINUOUS
Status: DISCONTINUED | OUTPATIENT
Start: 2022-01-16 | End: 2022-01-17

## 2022-01-16 RX ORDER — ASPIRIN 81 MG/1
324 TABLET, CHEWABLE ORAL ONCE
Status: COMPLETED | OUTPATIENT
Start: 2022-01-16 | End: 2022-01-16

## 2022-01-16 RX ORDER — UREA 10 %
3 LOTION (ML) TOPICAL NIGHTLY PRN
Status: DISCONTINUED | OUTPATIENT
Start: 2022-01-16 | End: 2022-01-19 | Stop reason: HOSPADM

## 2022-01-16 RX ORDER — POTASSIUM CHLORIDE 20 MEQ/1
40 TABLET, EXTENDED RELEASE ORAL PRN
Status: DISCONTINUED | OUTPATIENT
Start: 2022-01-16 | End: 2022-01-19 | Stop reason: HOSPADM

## 2022-01-16 RX ORDER — MAGNESIUM HYDROXIDE/ALUMINUM HYDROXICE/SIMETHICONE 120; 1200; 1200 MG/30ML; MG/30ML; MG/30ML
30 SUSPENSION ORAL
Status: COMPLETED | OUTPATIENT
Start: 2022-01-16 | End: 2022-01-16

## 2022-01-16 RX ORDER — ONDANSETRON 2 MG/ML
4 INJECTION INTRAMUSCULAR; INTRAVENOUS EVERY 4 HOURS PRN
Status: DISCONTINUED | OUTPATIENT
Start: 2022-01-16 | End: 2022-01-19 | Stop reason: HOSPADM

## 2022-01-16 RX ORDER — MAGNESIUM SULFATE IN WATER 40 MG/ML
2000 INJECTION, SOLUTION INTRAVENOUS ONCE
Status: COMPLETED | OUTPATIENT
Start: 2022-01-16 | End: 2022-01-16

## 2022-01-16 RX ORDER — METOPROLOL TARTRATE 5 MG/5ML
2.5 INJECTION INTRAVENOUS EVERY 6 HOURS PRN
Status: DISCONTINUED | OUTPATIENT
Start: 2022-01-16 | End: 2022-01-19 | Stop reason: HOSPADM

## 2022-01-16 RX ORDER — HEPARIN SODIUM 1000 [USP'U]/ML
4000 INJECTION, SOLUTION INTRAVENOUS; SUBCUTANEOUS ONCE
Status: COMPLETED | OUTPATIENT
Start: 2022-01-16 | End: 2022-01-16

## 2022-01-16 RX ORDER — IBUPROFEN 800 MG/1
800 TABLET ORAL
Status: DISCONTINUED | OUTPATIENT
Start: 2022-01-16 | End: 2022-01-16

## 2022-01-16 RX ORDER — HEPARIN SODIUM 1000 [USP'U]/ML
4000 INJECTION, SOLUTION INTRAVENOUS; SUBCUTANEOUS PRN
Status: DISCONTINUED | OUTPATIENT
Start: 2022-01-16 | End: 2022-01-17

## 2022-01-16 RX ADMIN — ATORVASTATIN CALCIUM 80 MG: 80 TABLET, FILM COATED ORAL at 22:48

## 2022-01-16 RX ADMIN — HEPARIN SODIUM 12 UNITS/KG/HR: 10000 INJECTION, SOLUTION INTRAVENOUS; SUBCUTANEOUS at 20:07

## 2022-01-16 RX ADMIN — ALUMINUM HYDROXIDE, MAGNESIUM HYDROXIDE, AND SIMETHICONE 30 ML: 200; 200; 20 SUSPENSION ORAL at 15:02

## 2022-01-16 RX ADMIN — SODIUM CHLORIDE: 9 INJECTION, SOLUTION INTRAVENOUS at 18:14

## 2022-01-16 RX ADMIN — IBUPROFEN 800 MG: 800 TABLET, FILM COATED ORAL at 19:02

## 2022-01-16 RX ADMIN — Medication 3 MG: at 23:42

## 2022-01-16 RX ADMIN — MAGNESIUM SULFATE 2000 MG: 2 INJECTION INTRAVENOUS at 21:06

## 2022-01-16 RX ADMIN — HEPARIN SODIUM 4000 UNITS: 1000 INJECTION INTRAVENOUS; SUBCUTANEOUS at 19:34

## 2022-01-16 RX ADMIN — ASPIRIN 324 MG: 81 TABLET, CHEWABLE ORAL at 15:36

## 2022-01-16 ASSESSMENT — PAIN DESCRIPTION - PAIN TYPE
TYPE: ACUTE PAIN
TYPE: ACUTE PAIN

## 2022-01-16 ASSESSMENT — PAIN DESCRIPTION - ONSET
ONSET: ON-GOING
ONSET: ON-GOING

## 2022-01-16 ASSESSMENT — ENCOUNTER SYMPTOMS
SHORTNESS OF BREATH: 0
DIARRHEA: 0
PHOTOPHOBIA: 0
SORE THROAT: 0
EYE REDNESS: 0
SINUS PRESSURE: 0
CHOKING: 0
ABDOMINAL PAIN: 1
EYE DISCHARGE: 0
RHINORRHEA: 0
SINUS PAIN: 0
NAUSEA: 0
ABDOMINAL DISTENTION: 0
WHEEZING: 0
ABDOMINAL PAIN: 0
COUGH: 0
VOMITING: 0
CONSTIPATION: 0

## 2022-01-16 ASSESSMENT — HEART SCORE: ECG: 1

## 2022-01-16 ASSESSMENT — PAIN SCALES - GENERAL
PAINLEVEL_OUTOF10: 7

## 2022-01-16 ASSESSMENT — PAIN DESCRIPTION - PROGRESSION
CLINICAL_PROGRESSION: NOT CHANGED
CLINICAL_PROGRESSION: GRADUALLY WORSENING

## 2022-01-16 ASSESSMENT — PAIN DESCRIPTION - DESCRIPTORS
DESCRIPTORS: PRESSURE
DESCRIPTORS: BURNING

## 2022-01-16 ASSESSMENT — PAIN DESCRIPTION - LOCATION
LOCATION: CHEST
LOCATION: CHEST

## 2022-01-16 ASSESSMENT — PAIN DESCRIPTION - FREQUENCY
FREQUENCY: CONTINUOUS
FREQUENCY: CONTINUOUS

## 2022-01-16 ASSESSMENT — PAIN DESCRIPTION - ORIENTATION: ORIENTATION: MID

## 2022-01-16 NOTE — ED NOTES
covid swab obtained labeled and sent to lab. Pt resting on cot, on monitor, no distress noted.       Cresencio Murphy RN  01/16/22 0727

## 2022-01-16 NOTE — ED PROVIDER NOTES
[facility]  Emergency Department  Faculty Attestation     I performed a history and physical examination of the patient and discussed management with the resident. I reviewed the residents note and agree with the documented findings and plan of care. Any areas of disagreement are noted on the chart. I was personally present for the key portions of any procedures. I have documented in the chart those procedures where I was not present during the key portions. I have reviewed the emergency nurses triage note. I agree with the chief complaint, past medical history, past surgical history, allergies, medications, social and family history as documented unless otherwise noted below. For Physician Assistant/ Nurse Practitioner cases/documentation I have personally evaluated this patient and have completed at least one if not all key elements of the E/M (history, physical exam, and MDM). Additional findings are as noted. Primary Care Physician:  No primary care provider on file. Screenings:  [unfilled]    CHIEF COMPLAINT       Chief Complaint   Patient presents with    Chest Pain    Shortness of Breath       RECENT VITALS:   Temp: 97.8 °F (36.6 °C),  Pulse: 82, Resp: 23, BP: (!) 144/90    LABS:  Labs Reviewed   CBC WITH AUTO DIFFERENTIAL   BASIC METABOLIC PANEL W/ REFLEX TO MG FOR LOW K   LIPASE   TROPONIN   BRAIN NATRIURETIC PEPTIDE       Radiology  XR CHEST PORTABLE    (Results Pending)         EKG:   EKG Interpretation    Interpreted by me    Rhythm: normal sinus   Rate: normal  Axis: normal  Ectopy: none  Conduction: normal  ST Segments: no acute change  T Waves: no acute change  Q Waves: none    Clinical Impression: no acute changes and normal EKG    Attending Physician Additional  Notes    Patient awoke this morning with epigastric and substernal discomfort without radiation to his arms neck or back. Some difficulty breathing. Not pleuritic pain.   No pain with swallowing. Is able to eat a burger without food bolus impaction type of symptoms. No excess belching. No fever chills or sweats. No leg pain Swelling or mobility. No cough sputum hemoptysis. No history of heart disease. No history of gallstones ulcers pancreatitis. On exam he is anxious, hyperventilating, hypertensive, afebrile, no respiratory distress. Lungs are clear. Chest nontender. Abdomen is soft with minimal epigastric tenderness without rebound guarding distention or tympany. Negative Forman sign no edema cords Homans or calf tenderness. GCS 15. Normal speech and mentation. Impression is atypical chest pain, consider esophagitis, ulcers, gastritis, pancreatitis, biliary disease, ACS. Plan is EKG, chest x-ray, laboratory studies, antacids, analgesics, consider antiemetics, reassess. Pamela Holt.  Andres Yates MD, [facility]  Attending Emergency  Physician               Natalia Gould MD  01/16/22 4448

## 2022-01-16 NOTE — H&P
[facility]  Office: [address], DO, Olechapo Rafita, DO, Julisa Afton, DO, Cayden Hilario Esposito, DO, Tabatha Vergara MD, Murray Carrington MD, Mamie Denney MD, Chrystal Aleman MD, Josh Monterroso MD, Kesha Rasmussen MD, Helga Hercules MD, Leora Ayers, DO, Girish Sykes, DO, Niharika Puckett MD,  Joseph Sanders, DO, Brad Wright MD, Pawel Marmolejo MD, Christiano Carreno MD, Shubham Thibodeaux MD, Shant Martinez MD, Maurice Zamora MD, Milo Alonzo MD, Chelle Greer, [facility], [facility], CNP, Jagdeep Owen, CNP, Jorge De La Torre, CNS, Modesta Puri, CNP, Barbara Alejandra, CNP, Vidya Monahan, CNP, Venu Albarado, CNP, Yanni Chong, CNP, Twila Askew PA-C, Natalie Jonas, [facility], Jaspreet Varma, [facility], Laura Thibodeaux, CNP, Lasandra Runner, CNP, Gabriel Bradford, CNP, Wili Bonds, CNP, Erika Cole, CNP, Milton De Jesus, [facility]    HISTORY AND PHYSICAL EXAMINATION            Date:   1/16/2022  Patient name: Tre Steele  Date of admission:  1/16/2022  2:45 PM  MRN:   [id number]  Account:  [de-identified]  YOB: 1995  PCP:    No primary care provider on file. Room:   [address] [place] [address]  Code Status:    Full Code    Chief Complaint:     Chief Complaint   Patient presents with    Chest Pain    Shortness of Breath       History Obtained From:     patient, electronic medical record    History of Present Illness: Tre Steele is a 27 y.o. Non- / non  male who presents with Chest Pain and Shortness of Breath   and is admitted to the hospital for the management of <principal problem not specified>. rTe Steele is a 27year old male who was admitted to [facility] for c/o acute chest pain and monitoring. Initial high sensitivity troponin was 50 with subsequent level @ 58. He was transferred to step-down unit to [address] [place] [address] for higher level of care.      Medical history includes a remote history of asthma (no medications or treatment since childhood) and  THC abuse Medical chart has history of hepatitis C however he states his body cleared this in [facility]. Today,  he states he awoke with pressure-like chest pain which is low, mid-sternal. He has never experienced this before. He reportedly stated pain was relieved by sitting forward, however upon exam he is agitated by interview and states pain is unchanged. There was no radiation of the pain. He denies associated N/V or diaphoresis. He is on his cell phone arguing with someone and is uncooperative with interview and physical exam.  He denies home use of medications. He endorses regular marijuana use, occasional alcohol and no tobacco use. Past Medical History:     Past Medical History:   Diagnosis Date    Asthma     Hepatitis C     Kidney stone     Major depression     Marijuana abuse         Past Surgical History:     History reviewed. No pertinent surgical history. Medications Prior to Admission:     Prior to Admission medications    Medication Sig Start Date End Date Taking? Authorizing Provider   sertraline (ZOLOFT) 50 MG tablet Take 50 mg by mouth every evening   Yes Historical Provider, MD   acetaminophen (TYLENOL) 500 MG tablet Take 2 tablets by mouth 3 times daily for 7 days 8/10/21 8/17/21  Nelwyn Client, MD   mirtazapine (REMERON) 30 MG tablet Take 30 mg by mouth nightly    Historical Provider, MD        Allergies:     Patient has no known allergies. Social History:     Tobacco:    reports that he has never smoked. He has never used smokeless tobacco.  Alcohol:      reports no history of alcohol use. Drug Use:  reports current drug use. Frequency: 7.00 times per week. Drug: Marijuana Orlin Bayard). Family History:     Family History   Problem Relation Age of Onset    No Known Problems Mother     No Known Problems Father        Review of Systems:     Positive and Negative as described in HPI.     Review of Systems   Constitutional: Negative for activity change, chills, fatigue and fever. HENT: Negative for congestion, rhinorrhea, sinus pressure, sinus pain and sore throat. Eyes: Negative for photophobia and visual disturbance. Respiratory: Negative for cough and shortness of breath. Cardiovascular: Positive for chest pain. Negative for leg swelling. Gastrointestinal: Negative for abdominal distention, abdominal pain, constipation, diarrhea, nausea and vomiting. Endocrine: Negative for polyphagia and polyuria. Genitourinary: Negative for difficulty urinating, flank pain and urgency. Musculoskeletal: Negative for arthralgias and myalgias. Skin: Negative for pallor, rash and wound. Neurological: Negative for tremors, syncope, speech difficulty and weakness. Hematological: Negative for adenopathy. Does not bruise/bleed easily. Psychiatric/Behavioral: Negative for confusion and sleep disturbance. Physical Exam:   BP (!) 144/90   Pulse 82   Temp 97.8 °F (36.6 °C) (Oral)   Resp 23   Ht 6' 1\" (1.854 m)   Wt 200 lb (90.7 kg)   SpO2 98%   BMI 26.39 kg/m²   Temp (24hrs), Av.8 °F (36.6 °C), Min:97.8 °F (36.6 °C), Max:97.8 °F (36.6 °C)    No results for input(s): POCGLU in the last 72 hours. No intake or output data in the 24 hours ending 22    Physical Exam  Vitals and nursing note reviewed. Constitutional:       General: He is not in acute distress. Appearance: Normal appearance. He is not ill-appearing. HENT:      Mouth/Throat:      Mouth: Mucous membranes are moist.      Pharynx: Oropharynx is clear. No oropharyngeal exudate. Eyes:      Pupils: Pupils are equal, round, and reactive to light. Cardiovascular:      Rate and Rhythm: Normal rate and regular rhythm. Pulses: Normal pulses. Heart sounds: Normal heart sounds. Pulmonary:      Effort: Pulmonary effort is normal.      Breath sounds: Normal breath sounds. Abdominal:      General: Bowel sounds are normal. There is no distension.       Palpations: Abdomen is soft. There is no mass. Musculoskeletal:         General: Normal range of motion. Cervical back: Normal range of motion. Skin:     General: Skin is warm and dry. Capillary Refill: Capillary refill takes less than 2 seconds. Neurological:      General: No focal deficit present. Mental Status: He is alert and oriented to person, place, and time. Psychiatric:         Attention and Perception: He is inattentive. Mood and Affect: Mood is anxious. Speech: Speech normal.         Behavior: Behavior is uncooperative.          Cognition and Memory: Cognition and memory normal.         Investigations:      Laboratory Testing:  Recent Results (from the past 24 hour(s))   CBC Auto Differential    Collection Time: 01/16/22  2:56 PM   Result Value Ref Range    WBC 8.4 3.5 - 11.3 k/uL    RBC 5.47 4.21 - 5.77 m/uL    Hemoglobin 14.8 13.0 - 17.0 g/dL    Hematocrit 46.3 40.7 - 50.3 %    MCV 84.6 82.6 - 102.9 fL    MCH 27.1 25.2 - 33.5 pg    MCHC 32.0 28.4 - 34.8 g/dL    RDW 12.7 11.8 - 14.4 %    Platelets 789 347 - 685 k/uL    MPV 9.3 8.1 - 13.5 fL    NRBC Automated 0.0 0.0 per 100 WBC    Differential Type NOT REPORTED     Seg Neutrophils 71 (H) 36 - 65 %    Lymphocytes 20 (L) 24 - 43 %    Monocytes 8 3 - 12 %    Eosinophils % 1 1 - 4 %    Basophils 0 0 - 2 %    Immature Granulocytes 0 0 %    Segs Absolute 5.90 1.50 - 8.10 k/uL    Absolute Lymph # 1.71 1.10 - 3.70 k/uL    Absolute Mono # 0.66 0.10 - 1.20 k/uL    Absolute Eos # 0.07 0.00 - 0.44 k/uL    Basophils Absolute <0.03 0.00 - 0.20 k/uL    Absolute Immature Granulocyte <0.03 0.00 - 0.30 k/uL    WBC Morphology NOT REPORTED     RBC Morphology NOT REPORTED     Platelet Estimate NOT REPORTED    Basic Metabolic Panel w/ Reflex to MG    Collection Time: 01/16/22  2:56 PM   Result Value Ref Range    Glucose 100 (H) 70 - 99 mg/dL    BUN 12 6 - 20 mg/dL    CREATININE 0.86 0.70 - 1.20 mg/dL    Bun/Cre Ratio NOT REPORTED 9 - 20    Calcium 9.3 8.6 - 10.4 mg/dL    Sodium 139 135 - 144 mmol/L    Potassium 3.9 3.7 - 5.3 mmol/L    Chloride 101 98 - 107 mmol/L    CO2 26 20 - 31 mmol/L    Anion Gap 12 9 - 17 mmol/L    GFR Non-African American >60 >60 mL/min    GFR African American >60 >60 mL/min    GFR Comment          GFR Staging NOT REPORTED    Lipase    Collection Time: 01/16/22  2:56 PM   Result Value Ref Range    Lipase 23 13 - 60 U/L   Troponin    Collection Time: 01/16/22  2:56 PM   Result Value Ref Range    Troponin, High Sensitivity 50 (H) 0 - 22 ng/L    Troponin T NOT REPORTED <0.03 ng/mL    Troponin Interp NOT REPORTED    Brain Natriuretic Peptide    Collection Time: 01/16/22  2:56 PM   Result Value Ref Range    Pro-BNP 83 <300 pg/mL    BNP Interpretation NOT REPORTED    COVID-19, Rapid    Collection Time: 01/16/22  3:42 PM    Specimen: Nasopharyngeal Swab   Result Value Ref Range    Specimen Description . NASOPHARYNGEAL SWAB     SARS-CoV-2, Rapid Not Detected Not Detected   Troponin    Collection Time: 01/16/22  3:58 PM   Result Value Ref Range    Troponin, High Sensitivity 58 (HH) 0 - 22 ng/L    Troponin T NOT REPORTED <0.03 ng/mL    Troponin Interp NOT REPORTED    Troponin    Collection Time: 01/16/22  6:31 PM   Result Value Ref Range    Troponin, High Sensitivity 118 (HH) 0 - 22 ng/L    Troponin T NOT REPORTED <0.03 ng/mL    Troponin Interp NOT REPORTED    APTT    Collection Time: 01/16/22  6:31 PM   Result Value Ref Range    PTT 24.5 20.5 - 30.5 sec   PROTIME-INR    Collection Time: 01/16/22  6:31 PM   Result Value Ref Range    Protime 10.2 9.1 - 12.3 sec    INR 0.9    CBC    Collection Time: 01/16/22  6:31 PM   Result Value Ref Range    WBC 9.3 3.5 - 11.3 k/uL    RBC 5.35 4.21 - 5.77 m/uL    Hemoglobin 14.7 13.0 - 17.0 g/dL    Hematocrit 45.2 40.7 - 50.3 %    MCV 84.5 82.6 - 102.9 fL    MCH 27.5 25.2 - 33.5 pg    MCHC 32.5 28.4 - 34.8 g/dL    RDW 12.7 11.8 - 14.4 %    Platelets 385 431 - 089 k/uL    MPV 9.3 8.1 - 13.5 fL    NRBC Automated 0.0 0.0 per 100 WBC   CK    Collection Time: 01/16/22  6:31 PM   Result Value Ref Range    Total  39 - 308 U/L   EKG 12 Lead    Collection Time: 01/16/22  6:53 PM   Result Value Ref Range    Ventricular Rate 68 BPM    Atrial Rate 68 BPM    P-R Interval 212 ms    QRS Duration 80 ms    Q-T Interval 384 ms    QTc Calculation (Bazett) 408 ms    P Axis 68 degrees    R Axis 57 degrees    T Axis 72 degrees       Imaging/Diagnostics:  XR CHEST PORTABLE    Result Date: 1/16/2022  No acute process. Assessment :      Hospital Problems           Last Modified POA    Chest pain 1/16/2022 Yes    ACS (acute coronary syndrome) ([facility] [address] 1/16/2022 Yes    Tetrahydrocannabinol (THC) dependence ([facility] [address] 1/16/2022 Yes          Plan:     Patient status inpatient in the Progressive Unit/Step down    Admit to Intermed services  Inpatient consult to cardiology. Appreciate recommendations. Trend trops. Heparin drip. EKG with any chest pain. Pending echo. Continuous cardiac monitoring. Daily CBC/BMP. Trend. Replete electrolytes as needed. DVT prophylaxis  GI prophylaxis  Education on THC abstinence  Full Code    Consultations:   IP CONSULT TO SOCIAL WORK  IP CONSULT TO CARDIOLOGY  IP CONSULT TO CARDIOLOGY  IP CONSULT TO INTERNAL MEDICINE     Patient is admitted as inpatient status because of co-morbidities listed above, severity of signs and symptoms as outlined, requirement for current medical therapies and most importantly because of direct risk to patient if care not provided in a hospital setting. Expected length of stay > 48 hours. SAGAR Hand NP  1/16/2022  7:35 PM    Copy sent to Dr. Chaudhry primary care provider on file.

## 2022-01-16 NOTE — ED PROVIDER NOTES
[facility]  Emergency Department Encounter  EmergencyMedicine Resident     Pt Name:Venancio Christianson  MRN: [id number]  [place] 1995  Date of evaluation: 1/16/22  PCP:  No primary care provider on file. CHIEF COMPLAINT       Chief Complaint   Patient presents with    Chest Pain    Shortness of Breath       HISTORY OF PRESENT ILLNESS  (Location/Symptom, Timing/Onset, Context/Setting, Quality, Duration, Modifying Factors, Severity.)      Brandon Rowe is a 27 y.o. male without significant past medical history, remote asthma, endorses some mental health related history with depression and anxiety, frequent marijuana use who presents with chest pain. He states he woke up today morning with chest pain, relieved with leaning forward. Patient states its substernal pressure-like and he has not taken any home medication to relieve this. He states he has some epigastric pain, the chest pain does hurt worse with deep breaths. He states he recently had the flu earlier this month and it has been a week approximately since he has recovered from this. It was a flulike illness body aches suddenly with headache and viral-like syndrome symptoms. He has no family history of cardiac death prior to the age of 39, no history of hyperlipidemia hypercholesterolemia, has never felt this pain before. He states the pain does not radiate and he is not having difficulty breathing. She denies recently having had an exertional moment such as playing sports or lifting heavy equipment, he states he was very relaxed over yesterday and today and does not believe anything provokes this. He does endorse having smoked marijuana in the past day and denies having cough or having used albuterol rescue inhaler. Denies palpitations. Denies change in vision. He denies fever.     PAST MEDICAL / SURGICAL / SOCIAL / FAMILY HISTORY      has a past medical history of Asthma, Hepatitis C, Kidney stone, Major depression, and Marijuana abuse.       has no past surgical history on file. Social History     Socioeconomic History    Marital status: Single     Spouse name: Not on file    Number of children: Not on file    Years of education: Not on file    Highest education level: Not on file   Occupational History    Not on file   Tobacco Use    Smoking status: Never Smoker    Smokeless tobacco: Never Used   Vaping Use    Vaping Use: Never used   Substance and Sexual Activity    Alcohol use: No    Drug use: Yes     Frequency: 7.0 times per week     Types: Marijuana Don Safer)    Sexual activity: Yes     Partners: Female   Other Topics Concern    Not on file   Social History Narrative    Not on file     Social Determinants of Health     Financial Resource Strain:     Difficulty of Paying Living Expenses: Not on file   Food Insecurity:     Worried About Running Out of Food in the Last Year: Not on file    Juanita of Food in the Last Year: Not on file   Transportation Needs:     Lack of Transportation (Medical): Not on file    Lack of Transportation (Non-Medical):  Not on file   Physical Activity:     Days of Exercise per Week: Not on file    Minutes of Exercise per Session: Not on file   Stress:     Feeling of Stress : Not on file   Social Connections:     Frequency of Communication with Friends and Family: Not on file    Frequency of Social Gatherings with Friends and Family: Not on file    Attends Confucianism Services: Not on file    Active Member of [address] [place] [address] or Organizations: Not on file    Attends Club or Organization Meetings: Not on file    Marital Status: Not on file   Intimate Partner Violence:     Fear of Current or Ex-Partner: Not on file    Emotionally Abused: Not on file    Physically Abused: Not on file    Sexually Abused: Not on file   Housing Stability:     Unable to Pay for Housing in the Last Year: Not on file    Number of [place] in the Last Year: Not on file    Unstable Housing in the Last Year: Not on file       History reviewed. No pertinent family history. Allergies:  Patient has no known allergies. Home Medications:  Prior to Admission medications    Medication Sig Start Date End Date Taking? Authorizing Provider   sertraline (ZOLOFT) 50 MG tablet Take 50 mg by mouth every evening   Yes Historical Provider, MD   acetaminophen (TYLENOL) 500 MG tablet Take 2 tablets by mouth 3 times daily for 7 days 8/10/21 8/17/21  Sydney Chris MD   mirtazapine (REMERON) 30 MG tablet Take 30 mg by mouth nightly    Historical Provider, MD       REVIEW OF SYSTEMS    (2-9 systems for level 4, 10 or more for level 5)      Review of Systems   Constitutional: Negative for activity change, appetite change, chills and fever. HENT: Negative for congestion, ear pain, rhinorrhea and sore throat. Eyes: Negative for discharge and redness. Respiratory: Negative for cough, choking, shortness of breath and wheezing. Cardiovascular: Positive for chest pain. Negative for leg swelling. Gastrointestinal: Positive for abdominal pain. Negative for constipation, diarrhea and vomiting. Endocrine: Negative for polydipsia and polyuria. Genitourinary: Negative for decreased urine volume, difficulty urinating, dysuria and frequency. Musculoskeletal: Negative for gait problem and joint swelling. Skin: Negative for rash and wound. Allergic/Immunologic: Negative for food allergies. Neurological: Negative for dizziness, speech difficulty and headaches. Psychiatric/Behavioral: Negative for behavioral problems. PHYSICAL EXAM   (up to 7 for level 4, 8 or more for level 5)      INITIAL VITALS:   BP (!) 144/90   Pulse 82   Temp 97.8 °F (36.6 °C) (Oral)   Resp 23   Ht 6' 1\" (1.854 m)   Wt 200 lb (90.7 kg)   SpO2 98%   BMI 26.39 kg/m²     Physical Exam  Vitals reviewed. Constitutional:       General: He is in acute distress. Appearance: He is well-developed and normal weight.  He is not ill-appearing, toxic-appearing or diaphoretic. Interventions: He is not intubated. Comments: BP (!) 144/90   Pulse 82   Temp 97.8 °F (36.6 °C) (Oral)   Resp 23   Ht 6' 1\" (1.854 m)   Wt 200 lb (90.7 kg)   SpO2 98%   BMI 26.39 kg/m²   Speaking in full sentences, anxious appearing, speaking with significant other in the room   HENT:      Head: Normocephalic and atraumatic. Right Ear: Tympanic membrane and external ear normal.      Left Ear: Tympanic membrane and external ear normal.      Nose: Nose normal.      Mouth/Throat:      Mouth: Mucous membranes are moist.      Pharynx: No pharyngeal swelling or oropharyngeal exudate. Eyes:      General:         Right eye: No discharge. Left eye: No discharge. Extraocular Movements: Extraocular movements intact. Conjunctiva/sclera: Conjunctivae normal.      Pupils: Pupils are equal, round, and reactive to light. Neck:      Thyroid: No thyromegaly. Vascular: No JVD. Cardiovascular:      Rate and Rhythm: Regular rhythm. Tachycardia present. No extrasystoles are present. Pulses: Normal pulses. No decreased pulses. Heart sounds: No murmur heard. No friction rub. No gallop. Pulmonary:      Effort: Pulmonary effort is normal. Tachypnea present. No bradypnea, accessory muscle usage or respiratory distress. He is not intubated. Breath sounds: Normal breath sounds. No stridor. No decreased breath sounds, wheezing, rhonchi or rales. Chest:      Chest wall: No mass, deformity, tenderness, crepitus or edema. Abdominal:      Palpations: There is no hepatomegaly, splenomegaly or mass. Tenderness: There is abdominal tenderness. There is no guarding or rebound. Comments: Epigastric tenderness     Genitourinary:     Rectum: Guaiac result negative. Musculoskeletal:         General: Normal range of motion. Cervical back: Normal range of motion and neck supple. Right lower leg: No tenderness. No edema.       Left lower leg: No tenderness. No edema. Lymphadenopathy:      Cervical: No cervical adenopathy. Skin:     General: Skin is warm. Capillary Refill: Capillary refill takes less than 2 seconds. Coloration: Skin is not cyanotic. Findings: No ecchymosis or rash. Neurological:      General: No focal deficit present. Mental Status: He is alert and oriented to person, place, and time. Cranial Nerves: No cranial nerve deficit. Psychiatric:         Mood and Affect: Mood is anxious. Behavior: Behavior is not agitated. DIFFERENTIAL  DIAGNOSIS     PLAN (LABS / IMAGING / EKG):  Orders Placed This Encounter   Procedures    COVID-19, Rapid    XR CHEST PORTABLE    CBC Auto Differential    Basic Metabolic Panel w/ Reflex to MG    Lipase    Troponin    Brain Natriuretic Peptide    Troponin    Comprehensive Metabolic Panel w/ Reflex to MG    Lactic acid, plasma    CBC    Troponin    APTT    PROTIME-INR    CBC    CBC    APTT    Troponin    Urine Drug Screen    ADULT DIET;  Regular    Vital signs per unit routine    Telemetry monitoring - 72 hour duration    Notify physician    Up as tolerated    Place intermittent pneumatic compression device    Full Code    Inpatient consult to Social Work    Consult to Cardiology    Inpatient consult to Cardiology    Inpatient consult to Internal Medicine    OT eval and treat    PT evaluation and treat    Initiate Oxygen Therapy Protocol    EKG 12 Lead    EKG 12 lead    EKG 12 Lead    ECHO Complete 2D W Doppler W Color    PATIENT STATUS (DIRECT) Inpatient    Transfer patient       MEDICATIONS ORDERED:  Orders Placed This Encounter   Medications    aluminum & magnesium hydroxide-simethicone (MAALOX) 200-200-20 MG/5ML suspension 30 mL    aspirin chewable tablet 324 mg    0.9 % sodium chloride infusion    sodium chloride flush 0.9 % injection 5-40 mL    sodium chloride flush 0.9 % injection 5-40 mL    0.9 % sodium chloride infusion    OR Linked Order Group     potassium chloride (KLOR-CON M) extended release tablet 40 mEq     potassium bicarb-citric acid (EFFER-K) effervescent tablet 40 mEq     potassium chloride 10 mEq/100 mL IVPB (Peripheral Line)    enoxaparin (LOVENOX) injection 40 mg    ondansetron (ZOFRAN) injection 4 mg    polyethylene glycol (GLYCOLAX) packet 17 g    OR Linked Order Group     acetaminophen (TYLENOL) tablet 650 mg     acetaminophen (TYLENOL) suppository 650 mg    ibuprofen (ADVIL;MOTRIN) tablet 600 mg    heparin (porcine) injection 4,000 Units    heparin (porcine) injection 4,000 Units    heparin (porcine) injection 2,000 Units    heparin 25,000 units in dextrose 5% 250 mL (premix) infusion       DDX: pancreatitis, gastritis, GERD, anxiety attack, chest pain, angina, ACS, PE, asthma exacerbation, pneumo, CHF, trauma, cholelithiasis, esophageal food bolus    DIAGNOSTIC RESULTS / EMERGENCY DEPARTMENT COURSE / MDM   LAB RESULTS:  Results for orders placed or performed during the hospital encounter of 01/16/22   COVID-19, Rapid    Specimen: Nasopharyngeal Swab   Result Value Ref Range    Specimen Description . NASOPHARYNGEAL SWAB     SARS-CoV-2, Rapid Not Detected Not Detected   CBC Auto Differential   Result Value Ref Range    WBC 8.4 3.5 - 11.3 k/uL    RBC 5.47 4.21 - 5.77 m/uL    Hemoglobin 14.8 13.0 - 17.0 g/dL    Hematocrit 46.3 40.7 - 50.3 %    MCV 84.6 82.6 - 102.9 fL    MCH 27.1 25.2 - 33.5 pg    MCHC 32.0 28.4 - 34.8 g/dL    RDW 12.7 11.8 - 14.4 %    Platelets 800 027 - 941 k/uL    MPV 9.3 8.1 - 13.5 fL    NRBC Automated 0.0 0.0 per 100 WBC    Differential Type NOT REPORTED     Seg Neutrophils 71 (H) 36 - 65 %    Lymphocytes 20 (L) 24 - 43 %    Monocytes 8 3 - 12 %    Eosinophils % 1 1 - 4 %    Basophils 0 0 - 2 %    Immature Granulocytes 0 0 %    Segs Absolute 5.90 1.50 - 8.10 k/uL    Absolute Lymph # 1.71 1.10 - 3.70 k/uL    Absolute Mono # 0.66 0.10 - 1.20 k/uL    Absolute Eos # 0.07 0.00 - 0.44 k/uL    Basophils Absolute <0.03 0.00 - 0.20 k/uL    Absolute Immature Granulocyte <0.03 0.00 - 0.30 k/uL    WBC Morphology NOT REPORTED     RBC Morphology NOT REPORTED     Platelet Estimate NOT REPORTED    Basic Metabolic Panel w/ Reflex to MG   Result Value Ref Range    Glucose 100 (H) 70 - 99 mg/dL    BUN 12 6 - 20 mg/dL    CREATININE 0.86 0.70 - 1.20 mg/dL    Bun/Cre Ratio NOT REPORTED 9 - 20    Calcium 9.3 8.6 - 10.4 mg/dL    Sodium 139 135 - 144 mmol/L    Potassium 3.9 3.7 - 5.3 mmol/L    Chloride 101 98 - 107 mmol/L    CO2 26 20 - 31 mmol/L    Anion Gap 12 9 - 17 mmol/L    GFR Non-African American >60 >60 mL/min    GFR African American >60 >60 mL/min    GFR Comment          GFR Staging NOT REPORTED    Lipase   Result Value Ref Range    Lipase 23 13 - 60 U/L   Troponin   Result Value Ref Range    Troponin, High Sensitivity 50 (H) 0 - 22 ng/L    Troponin T NOT REPORTED <0.03 ng/mL    Troponin Interp NOT REPORTED    Brain Natriuretic Peptide   Result Value Ref Range    Pro-BNP 83 <300 pg/mL    BNP Interpretation NOT REPORTED    Troponin   Result Value Ref Range    Troponin, High Sensitivity 58 (HH) 0 - 22 ng/L    Troponin T NOT REPORTED <0.03 ng/mL    Troponin Interp NOT REPORTED    EKG 12 Lead   Result Value Ref Range    Ventricular Rate 68 BPM    Atrial Rate 68 BPM    P-R Interval 212 ms    QRS Duration 80 ms    Q-T Interval 384 ms    QTc Calculation (Bazett) 408 ms    P Axis 68 degrees    R Axis 57 degrees    T Axis 72 degrees       IMPRESSION: Concern for pericarditis -given pain and recent weight overcoming a flulike syndrome, ACS given troponin elevation, myocarditis -unlikely given blood pressure stable and patient overall well-appearing    RADIOLOGY:  XR CHEST PORTABLE   Final Result   No acute process.                EKG    All EKG's are interpreted by the Emergency Department Physician who either signs or Co-signs this chart in the absence of a cardiologist.    [address] [place] [address]  ED Course as of 01/16/22 1819   Sun Jan 16, 2022   1509 CBC Auto Differential(!):    WBC 8.4   RBC 5.47   Hemoglobin Quant 14.8   Hematocrit 46.3   MCV 84.6   MCH 27.1   MCHC 32.0   RDW 12.7   Platelet Count 171   MPV 9.3   NRBC Automated 0.0   Differential Type NOT REPORTED   Seg Neutrophils 71(!)   Lymphocytes 20(!)   Monocytes 8   Eosinophils % 1   Basophils 0   Immature Granulocytes 0   Segs Absolute 5.90   Absolute Lymph # 1.71   Absolute Mono # 0.66   Absolute Eos # 0.07   Basophils Absolute <0.03   Absolute Immature Granulocyte <0.03   WBC Morphology NOT REPORTED   RBC Morphology NOT REPORTED   Platelet Estimate NOT REPORTED [LL]   1509 VSS, patient appears anxious and in pain and heavy breathing, speaking in full sentences, no SOB, no wheezing on exam, chest with normal heart tones, no LE swelling or edema, abdomen tender to epigastrium, and normal BS, moving all extremities equal and well. Labs obtained, will order lipase as well and give maalox for concern for gastritis, if no relief will provide analgesia morphine or fentanyl, and or anxiety relief medication. And Will reassess. [LL]   1534 XR CHEST PORTABLE [LL]   4300 Patient denies drug use including cocaine, marijuana, methamphetamine, Adderall or any other medication not prescribed to him or illicit drug use within the past week. Patient states in the past she has used this medication however not recently. Patient states he has not lifted heavy items, has no trauma to his chest, he has not played sports or had exertional activity over the past few days. Patient did begin crying during my examination was asking questions, patient was made aware of his elevated troponin and the need for reevaluation in admission. COVID-19 test ordered as well as aspirin and patient agreeable with further work-up.  [LL]   6781 Basic Metabolic Panel w/ Reflex to MG(!):    Glucose 100(!)   BUN 12   Creatinine 0.86   Bun/Cre Ratio NOT REPORTED   CALCIUM, SERUM, 529697 9.3 Sodium 139   Potassium 3.9   Chloride 101   CO2 26   Anion Gap 12   GFR Non- >60   GFR  >60   GFR Comment        GFR Staging NOT REPORTED [LL]   1543 Lipase:    Lipase 23 [LL]   1543 Lipase electrolytes and CBC normal. [LL]   1543 XR CHEST PORTABLE [LL]   1600 Father (on the phone) and patient updated on labs and imaging [LL]   1630 Troponin [LL]   1721 1 to alert patient on his most recent troponin, he did remember that he had a virus the beginning of this month approximately 10 days ago, concern for pericarditis at this time we will plan to call cardiology. And do bedside cardiac ultrasound. [LL]   1721 Troponin(!!):    Troponin, High Sensitivity 58(!!)   Troponin T NOT REPORTED   Troponin Interp NOT REPORTED [LL]   36 Spoke with cardiology fellow  Will admit to Intermed service with serial EKGs and serial troponins. Recommended stepdown or MedSurg bed that is monitored. Plan for admission to Intermed service. [LL]      ED Course User Index  [LL] Juan Bernstein MD     Patient initially admitted to observation unit and then transferred to Intermed service with serial EKG and serial troponin for closer monitoring with cardiology evaluation with concern for ACS -  added lab PT/PTT , and started heparin. RN on observation unit updated on change of disposition and need for close monitoring - she stated she sent repeat EKG to cardiology fellow already -  And aware of my orders for heparin (based on my discussion with cardiology) - plan to transfer beds soon. Anahy Garcia MD is admitting attending as of 6:19 PM    PROCEDURES:  none    CONSULTS:  IP CONSULT TO SOCIAL WORK  IP CONSULT TO CARDIOLOGY  IP CONSULT TO CARDIOLOGY  IP CONSULT TO INTERNAL MEDICINE    CRITICAL CARE:  Please see attending note    FINAL IMPRESSION      1. Chest pain, unspecified type          DISPOSITION / PLAN     DISPOSITION  admitted      PATIENT REFERRED TO:  No follow-up provider specified.     DISCHARGE MEDICATIONS:  Current Discharge Medication List          Boris Olivo MD  Emergency Medicine Resident    (Please note that portions of thisnote were completed with a voice recognition program.  Efforts were made to edit the dictations but occasionally words are mis-transcribed.)       Boris Olivo MD  Resident  01/16/22 6422

## 2022-01-16 NOTE — ED NOTES
Patient to ed from home with friend at bedside, reports chest pain upon waking up. Patient reports it has been worsening all day. Reports shortness of breath. Tolerating food, eating and drinking normally. Patient is aox4. Denies N/V/D.       Gloria Dukes RN  01/16/22 1500

## 2022-01-17 ENCOUNTER — APPOINTMENT (OUTPATIENT)
Dept: CARDIAC CATH/INVASIVE PROCEDURES | Age: 27
DRG: 174 | End: 2022-01-17
Payer: MEDICAID

## 2022-01-17 PROBLEM — I21.4 NON-ST ELEVATION MI (NSTEMI) (HCC): Status: ACTIVE | Noted: 2022-01-17

## 2022-01-17 LAB
ACTIVATED CLOTTING TIME: 309 SEC (ref 79–149)
ALBUMIN SERPL-MCNC: 3.6 G/DL (ref 3.5–5.2)
ALBUMIN/GLOBULIN RATIO: 1.3 (ref 1–2.5)
ALP BLD-CCNC: 56 U/L (ref 40–129)
ALT SERPL-CCNC: 24 U/L (ref 5–41)
ANION GAP SERPL CALCULATED.3IONS-SCNC: 11 MMOL/L (ref 9–17)
AST SERPL-CCNC: 57 U/L
BILIRUB SERPL-MCNC: 0.24 MG/DL (ref 0.3–1.2)
BUN BLDV-MCNC: 9 MG/DL (ref 6–20)
BUN/CREAT BLD: ABNORMAL (ref 9–20)
CALCIUM SERPL-MCNC: 8.3 MG/DL (ref 8.6–10.4)
CHLORIDE BLD-SCNC: 107 MMOL/L (ref 98–107)
CHOLESTEROL/HDL RATIO: 5.1
CHOLESTEROL: 179 MG/DL
CO2: 21 MMOL/L (ref 20–31)
CREAT SERPL-MCNC: 0.83 MG/DL (ref 0.7–1.2)
EKG ATRIAL RATE: 68 BPM
EKG ATRIAL RATE: 71 BPM
EKG ATRIAL RATE: 71 BPM
EKG ATRIAL RATE: 85 BPM
EKG P AXIS: 67 DEGREES
EKG P AXIS: 67 DEGREES
EKG P AXIS: 68 DEGREES
EKG P AXIS: 71 DEGREES
EKG P-R INTERVAL: 184 MS
EKG P-R INTERVAL: 202 MS
EKG P-R INTERVAL: 212 MS
EKG P-R INTERVAL: 220 MS
EKG Q-T INTERVAL: 340 MS
EKG Q-T INTERVAL: 384 MS
EKG Q-T INTERVAL: 386 MS
EKG Q-T INTERVAL: 396 MS
EKG QRS DURATION: 78 MS
EKG QRS DURATION: 78 MS
EKG QRS DURATION: 80 MS
EKG QRS DURATION: 80 MS
EKG QTC CALCULATION (BAZETT): 404 MS
EKG QTC CALCULATION (BAZETT): 408 MS
EKG QTC CALCULATION (BAZETT): 419 MS
EKG QTC CALCULATION (BAZETT): 430 MS
EKG R AXIS: 57 DEGREES
EKG R AXIS: 60 DEGREES
EKG R AXIS: 60 DEGREES
EKG R AXIS: 61 DEGREES
EKG T AXIS: 65 DEGREES
EKG T AXIS: 72 DEGREES
EKG T AXIS: 82 DEGREES
EKG T AXIS: 90 DEGREES
EKG VENTRICULAR RATE: 68 BPM
EKG VENTRICULAR RATE: 71 BPM
EKG VENTRICULAR RATE: 71 BPM
EKG VENTRICULAR RATE: 85 BPM
GFR AFRICAN AMERICAN: >60 ML/MIN
GFR NON-AFRICAN AMERICAN: >60 ML/MIN
GFR SERPL CREATININE-BSD FRML MDRD: ABNORMAL ML/MIN/{1.73_M2}
GFR SERPL CREATININE-BSD FRML MDRD: ABNORMAL ML/MIN/{1.73_M2}
GLUCOSE BLD-MCNC: 103 MG/DL (ref 70–99)
GLUCOSE BLD-MCNC: 117 MG/DL (ref 75–110)
HCT VFR BLD CALC: 41.3 % (ref 40.7–50.3)
HDLC SERPL-MCNC: 35 MG/DL
HEMOGLOBIN: 13.3 G/DL (ref 13–17)
LACTIC ACID, WHOLE BLOOD: 0.8 MMOL/L (ref 0.7–2.1)
LACTIC ACID: NORMAL MMOL/L
LDL CHOLESTEROL: 121 MG/DL (ref 0–130)
LV EF: 43 %
LVEF MODALITY: NORMAL
MAGNESIUM: 2.4 MG/DL (ref 1.6–2.6)
MCH RBC QN AUTO: 27.1 PG (ref 25.2–33.5)
MCHC RBC AUTO-ENTMCNC: 32.2 G/DL (ref 28.4–34.8)
MCV RBC AUTO: 84.1 FL (ref 82.6–102.9)
NRBC AUTOMATED: 0 PER 100 WBC
PARTIAL THROMBOPLASTIN TIME: 31.6 SEC (ref 20.5–30.5)
PDW BLD-RTO: 12.6 % (ref 11.8–14.4)
PLATELET # BLD: 273 K/UL (ref 138–453)
PMV BLD AUTO: 9.5 FL (ref 8.1–13.5)
POTASSIUM SERPL-SCNC: 4 MMOL/L (ref 3.7–5.3)
RBC # BLD: 4.91 M/UL (ref 4.21–5.77)
SODIUM BLD-SCNC: 139 MMOL/L (ref 135–144)
TOTAL PROTEIN: 6.3 G/DL (ref 6.4–8.3)
TRIGL SERPL-MCNC: 115 MG/DL
TROPONIN INTERP: ABNORMAL
TROPONIN T: ABNORMAL NG/ML
TROPONIN, HIGH SENSITIVITY: 426 NG/L (ref 0–22)
TROPONIN, HIGH SENSITIVITY: 626 NG/L (ref 0–22)
TROPONIN, HIGH SENSITIVITY: 658 NG/L (ref 0–22)
VLDLC SERPL CALC-MCNC: ABNORMAL MG/DL (ref 1–30)
WBC # BLD: 10.1 K/UL (ref 3.5–11.3)

## 2022-01-17 PROCEDURE — 6370000000 HC RX 637 (ALT 250 FOR IP): Performed by: STUDENT IN AN ORGANIZED HEALTH CARE EDUCATION/TRAINING PROGRAM

## 2022-01-17 PROCEDURE — 2709999900 HC NON-CHARGEABLE SUPPLY

## 2022-01-17 PROCEDURE — 80053 COMPREHEN METABOLIC PANEL: CPT

## 2022-01-17 PROCEDURE — 93454 CORONARY ARTERY ANGIO S&I: CPT

## 2022-01-17 PROCEDURE — B2151ZZ FLUOROSCOPY OF LEFT HEART USING LOW OSMOLAR CONTRAST: ICD-10-PCS | Performed by: INTERNAL MEDICINE

## 2022-01-17 PROCEDURE — 2060000000 HC ICU INTERMEDIATE R&B

## 2022-01-17 PROCEDURE — 6360000002 HC RX W HCPCS: Performed by: PEDIATRICS

## 2022-01-17 PROCEDURE — 92921 HC PRQ CARDIAC ANGIO ADDL ART: CPT

## 2022-01-17 PROCEDURE — 93010 ELECTROCARDIOGRAM REPORT: CPT | Performed by: INTERNAL MEDICINE

## 2022-01-17 PROCEDURE — C1887 CATHETER, GUIDING: HCPCS

## 2022-01-17 PROCEDURE — 99232 SBSQ HOSP IP/OBS MODERATE 35: CPT | Performed by: INTERNAL MEDICINE

## 2022-01-17 PROCEDURE — 6370000000 HC RX 637 (ALT 250 FOR IP)

## 2022-01-17 PROCEDURE — C1894 INTRO/SHEATH, NON-LASER: HCPCS

## 2022-01-17 PROCEDURE — 93306 TTE W/DOPPLER COMPLETE: CPT

## 2022-01-17 PROCEDURE — 6360000002 HC RX W HCPCS: Performed by: STUDENT IN AN ORGANIZED HEALTH CARE EDUCATION/TRAINING PROGRAM

## 2022-01-17 PROCEDURE — 85347 COAGULATION TIME ACTIVATED: CPT

## 2022-01-17 PROCEDURE — C1769 GUIDE WIRE: HCPCS

## 2022-01-17 PROCEDURE — 36415 COLL VENOUS BLD VENIPUNCTURE: CPT

## 2022-01-17 PROCEDURE — 92920 PRQ TRLUML C ANGIOP 1ART&/BR: CPT

## 2022-01-17 PROCEDURE — B2111ZZ FLUOROSCOPY OF MULTIPLE CORONARY ARTERIES USING LOW OSMOLAR CONTRAST: ICD-10-PCS | Performed by: INTERNAL MEDICINE

## 2022-01-17 PROCEDURE — 2580000003 HC RX 258: Performed by: STUDENT IN AN ORGANIZED HEALTH CARE EDUCATION/TRAINING PROGRAM

## 2022-01-17 PROCEDURE — 2500000003 HC RX 250 WO HCPCS

## 2022-01-17 PROCEDURE — 4A023N7 MEASUREMENT OF CARDIAC SAMPLING AND PRESSURE, LEFT HEART, PERCUTANEOUS APPROACH: ICD-10-PCS | Performed by: INTERNAL MEDICINE

## 2022-01-17 PROCEDURE — 02C03ZZ EXTIRPATION OF MATTER FROM CORONARY ARTERY, ONE ARTERY, PERCUTANEOUS APPROACH: ICD-10-PCS | Performed by: INTERNAL MEDICINE

## 2022-01-17 PROCEDURE — 93356 MYOCRD STRAIN IMG SPCKL TRCK: CPT

## 2022-01-17 PROCEDURE — C1757 CATH, THROMBECTOMY/EMBOLECT: HCPCS

## 2022-01-17 PROCEDURE — 83735 ASSAY OF MAGNESIUM: CPT

## 2022-01-17 PROCEDURE — C1725 CATH, TRANSLUMIN NON-LASER: HCPCS

## 2022-01-17 PROCEDURE — 6360000004 HC RX CONTRAST MEDICATION

## 2022-01-17 PROCEDURE — 02713ZZ DILATION OF CORONARY ARTERY, TWO ARTERIES, PERCUTANEOUS APPROACH: ICD-10-PCS | Performed by: INTERNAL MEDICINE

## 2022-01-17 PROCEDURE — 84484 ASSAY OF TROPONIN QUANT: CPT

## 2022-01-17 PROCEDURE — 85730 THROMBOPLASTIN TIME PARTIAL: CPT

## 2022-01-17 PROCEDURE — 85027 COMPLETE CBC AUTOMATED: CPT

## 2022-01-17 PROCEDURE — 93005 ELECTROCARDIOGRAM TRACING: CPT | Performed by: PEDIATRICS

## 2022-01-17 PROCEDURE — 80061 LIPID PANEL: CPT

## 2022-01-17 PROCEDURE — 82947 ASSAY GLUCOSE BLOOD QUANT: CPT

## 2022-01-17 PROCEDURE — 83605 ASSAY OF LACTIC ACID: CPT

## 2022-01-17 PROCEDURE — 6360000002 HC RX W HCPCS

## 2022-01-17 RX ORDER — NITROGLYCERIN 20 MG/100ML
5-200 INJECTION INTRAVENOUS CONTINUOUS
Status: DISCONTINUED | OUTPATIENT
Start: 2022-01-17 | End: 2022-01-17

## 2022-01-17 RX ORDER — ATORVASTATIN CALCIUM 80 MG/1
80 TABLET, FILM COATED ORAL NIGHTLY
Status: DISCONTINUED | OUTPATIENT
Start: 2022-01-17 | End: 2022-01-19 | Stop reason: HOSPADM

## 2022-01-17 RX ORDER — SODIUM CHLORIDE 9 MG/ML
25 INJECTION, SOLUTION INTRAVENOUS PRN
Status: DISCONTINUED | OUTPATIENT
Start: 2022-01-17 | End: 2022-01-19 | Stop reason: HOSPADM

## 2022-01-17 RX ORDER — ASPIRIN 81 MG/1
81 TABLET, CHEWABLE ORAL DAILY
Status: DISCONTINUED | OUTPATIENT
Start: 2022-01-18 | End: 2022-01-19 | Stop reason: HOSPADM

## 2022-01-17 RX ORDER — ACETAMINOPHEN 325 MG/1
650 TABLET ORAL EVERY 4 HOURS PRN
Status: DISCONTINUED | OUTPATIENT
Start: 2022-01-17 | End: 2022-01-19 | Stop reason: HOSPADM

## 2022-01-17 RX ORDER — OXYCODONE HYDROCHLORIDE AND ACETAMINOPHEN 5; 325 MG/1; MG/1
1 TABLET ORAL ONCE
Status: COMPLETED | OUTPATIENT
Start: 2022-01-17 | End: 2022-01-17

## 2022-01-17 RX ORDER — SODIUM CHLORIDE 0.9 % (FLUSH) 0.9 %
5-40 SYRINGE (ML) INJECTION PRN
Status: DISCONTINUED | OUTPATIENT
Start: 2022-01-17 | End: 2022-01-19 | Stop reason: HOSPADM

## 2022-01-17 RX ORDER — SODIUM CHLORIDE 0.9 % (FLUSH) 0.9 %
5-40 SYRINGE (ML) INJECTION EVERY 12 HOURS SCHEDULED
Status: DISCONTINUED | OUTPATIENT
Start: 2022-01-17 | End: 2022-01-19 | Stop reason: HOSPADM

## 2022-01-17 RX ORDER — ONDANSETRON 2 MG/ML
4 INJECTION INTRAMUSCULAR; INTRAVENOUS EVERY 6 HOURS PRN
Status: DISCONTINUED | OUTPATIENT
Start: 2022-01-17 | End: 2022-01-19 | Stop reason: HOSPADM

## 2022-01-17 RX ADMIN — AMIODARONE HYDROCHLORIDE 150 MG: 50 INJECTION, SOLUTION INTRAVENOUS at 00:09

## 2022-01-17 RX ADMIN — ATORVASTATIN CALCIUM 80 MG: 80 TABLET, FILM COATED ORAL at 20:41

## 2022-01-17 RX ADMIN — ONDANSETRON 4 MG: 2 INJECTION INTRAMUSCULAR; INTRAVENOUS at 06:46

## 2022-01-17 RX ADMIN — SODIUM CHLORIDE: 9 INJECTION, SOLUTION INTRAVENOUS at 18:58

## 2022-01-17 RX ADMIN — DEXTROSE 1 MG/MIN: 5 SOLUTION INTRAVENOUS at 02:02

## 2022-01-17 RX ADMIN — Medication 3 MG: at 20:41

## 2022-01-17 RX ADMIN — ACETAMINOPHEN 650 MG: 325 TABLET ORAL at 18:21

## 2022-01-17 RX ADMIN — OXYCODONE HYDROCHLORIDE AND ACETAMINOPHEN 1 TABLET: 5; 325 TABLET ORAL at 20:41

## 2022-01-17 RX ADMIN — ASPIRIN 81 MG: 81 TABLET, CHEWABLE ORAL at 09:22

## 2022-01-17 RX ADMIN — HEPARIN SODIUM 2000 UNITS: 1000 INJECTION INTRAVENOUS; SUBCUTANEOUS at 05:38

## 2022-01-17 ASSESSMENT — PAIN SCALES - GENERAL
PAINLEVEL_OUTOF10: 7
PAINLEVEL_OUTOF10: 2
PAINLEVEL_OUTOF10: 7
PAINLEVEL_OUTOF10: 0

## 2022-01-17 ASSESSMENT — ENCOUNTER SYMPTOMS
VOMITING: 0
ABDOMINAL PAIN: 0
COUGH: 0
NAUSEA: 0
SHORTNESS OF BREATH: 1

## 2022-01-17 ASSESSMENT — PAIN DESCRIPTION - LOCATION: LOCATION: STERNUM

## 2022-01-17 ASSESSMENT — PAIN DESCRIPTION - DESCRIPTORS: DESCRIPTORS: PRESSURE

## 2022-01-17 NOTE — PROGRESS NOTES
[facility]  Office: [address], DO, Paola Lopezs, DO, Aliyah Mayer, DO, Cipriano Ledbetterrylie Blood, DO, Hamlet Wan MD, Marjan Miner MD, Demetrius Snow MD, Tamiko Painting MD, Logan Norwood MD, Greta Espinal MD, Burak Decker MD, Tammy Benavides, DO, Debbie Covarrubias, DO, Harrietta Goldberg, MD,  Raul Dalton DO, Yenifer Mantilla MD, Kelsi Fernández MD, Elizabeth Mccollum MD, Facundo Mills MD, Filiberto Pérez MD, Angelito Rose MD, Harvey Flanagan MD, [facility], [facility], [facility], CNP, Jose Angel Troncoso, CNP, Adolfo Mcgrath, CNS, Sandro Obregon, CNP, Salas Montoya, CNP, Rafal Esquivel, CNP, Leeann Norton, CNP, Lawrence Joseph, CNP, Jaymie Clemens PA-C, Lou Jackson, [facility], Iftikhar Antunez [facility], Richard Cheung, CNP, Johan Carreonh, CNP, Kiel Madera, [facility], Himanshu El, CNP, Johnna Resendez CNP, Seven Seg[place], [facility]    Progress Note    1/17/2022    2:03 PM    Name:   Miesha Tafoya  MRN:     [id number]     [place]:      [de-identified]   Room:   [address] [place] [address] Day:  1  Admit Date:  1/16/2022  2:45 PM    PCP:   No primary care provider on file. Code Status:  Full Code    Subjective:     C/C:   Chief Complaint   Patient presents with    Chest Pain    Shortness of Breath     Interval History Status:   Comfortable  Postop cath  Chest pain resolved  Hungry    Data Base Updates:  ECG  Normal sinus rhythm   Septal infarct , age undetermined   Abnormal ECG   No previous ECGs available     Troponin, High Sensitivity 658 High Panic        Brief History:     As documented in the medical record: Abdias Cortez is a 27year old male who was admitted to [facility] for c/o acute chest pain and monitoring. Initial high sensitivity troponin was 50 with subsequent level @ 58. He was transferred to step-down unit to [address] [place] [address] for higher level of care.       Medical history includes a remote history of asthma (no medications or treatment since childhood) and  THC abuse Medical chart has history of hepatitis C however he states his body cleared this in prison. Today,  he states he awoke with pressure-like chest pain which is low, mid-sternal. He has never experienced this before. He reportedly stated pain was relieved by sitting forward, however upon exam he is agitated by interview and states pain is unchanged. There was no radiation of the pain. He denies associated N/V or diaphoresis. He is on his cell phone arguing with someone and is uncooperative with interview and physical exam.  He denies home use of medications. He endorses regular marijuana use, occasional alcohol and no tobacco use. \"    The intitial assessment and plan included:  \"Admit to Intermed services  Inpatient consult to cardiology. Appreciate recommendations. Trend trops. Heparin drip. EKG with any chest pain. Pending echo. Continuous cardiac monitoring. Daily CBC/BMP. Trend. Replete electrolytes as needed. DVT prophylaxis  GI prophylaxis  Education on THC abstinence  Full Code\"    Subsequent database has revealed:  EKG:  Normal sinus rhythm   Septal infarct , age undetermined   Abnormal ECG   No previous ECGs available         Troponins:  Results for Karen Barrientos (MRN [id number]) as of 1/17/2022 13:40   Ref. Range 1/16/2022 14:56 1/16/2022 15:58 1/16/2022 18:31 1/16/2022 19:13 1/17/2022 03:36   Troponin, High Sensitivity Latest Ref Range: 0 - 22 ng/L 50 (H) 58 (HH) 118 (HH) 133 (HH) 658 (HH)     Cardiac cath:   Procedure Summary     Two vessel acute occlusion, most likely thrombotic 100% D2, 80% D1    Successful PTCA pf D1, regain PA III flow,    Unsuccessful PTCA / thrombectomy f D2< with PA flow continue 0 with no  flow. Recommendations     Post PTCA -Protocol    To start IIB-IIIa antiplatelet. Angiographic Findings     Cardiac Arteries and Lesion Findings    LMCA: Normal 0% stenosis.    LAD: Multiple aneurysmal area in proximal and mid   D1: Mid 80% stenosis / thrombus   D2: 100% stenosis with PA 0 flow      Lesion on 2nd Diag: Proximal subsection. 100% stenosis 30 mm length     reduced to 100%. Pre procedure PA 0 flow was noted. Post Procedure PA     0 flow was present. Poor runoff was present. The lesion was diagnosed as     High Risk (C). Past Medical History:   has a past medical history of Asthma, Hepatitis C, Kidney stone, Major depression, and Marijuana abuse. Social History:   reports that he has never smoked. He has never used smokeless tobacco. He reports current drug use. Frequency: 7.00 times per week. Drug: Marijuana [place]y). He reports that he does not drink alcohol. Family History:   Family History   Problem Relation Age of Onset    No Known Problems Mother     No Known Problems Father        Review of Systems:     Review of Systems   Respiratory: Positive for shortness of breath (Improving). Negative for cough. Cardiovascular: Positive for chest pain (Resolving). Negative for palpitations. Gastrointestinal: Negative for abdominal pain, nausea and vomiting. Genitourinary: Negative for flank pain and hematuria. Physical Examination:        Vitals  BP (!) 122/92   Pulse 63   Temp 98.2 °F (36.8 °C) (Oral)   Resp 14   Ht 6' 1\" (1.854 m)   Wt 200 lb (90.7 kg)   SpO2 97%   BMI 26.39 kg/m²   Temp (24hrs), Av.1 °F (36.7 °C), Min:97.8 °F (36.6 °C), Max:98.2 °F (36.8 °C)    Recent Labs     22  0714   POCGLU 117*       Physical Exam  Vitals reviewed. Constitutional:       General: He is not in acute distress. Appearance: He is not diaphoretic. HENT:      Head: Normocephalic. Nose: Nose normal.   Eyes:      General: No scleral icterus. Conjunctiva/sclera: Conjunctivae normal.   Neck:      Trachea: No tracheal deviation. Cardiovascular:      Rate and Rhythm: Normal rate and regular rhythm. Pulmonary:      Effort: Pulmonary effort is normal. No respiratory distress. Breath sounds: Normal breath sounds. No wheezing or rales.    Chest: Chest wall: No tenderness. Abdominal:      General: There is no distension. Palpations: Abdomen is soft. Tenderness: There is no abdominal tenderness. Musculoskeletal:         General: No tenderness. Cervical back: Neck supple. Skin:     General: Skin is warm and dry. Neurological:      Mental Status: He is alert and oriented to person, place, and time. Medications: Allergies:  No Known Allergies    Current Meds:   Scheduled Meds:    sodium chloride flush  5-40 mL IntraVENous 2 times per day    atorvastatin  80 mg Oral Nightly    [START ON 1/18/2022] aspirin  81 mg Oral Daily    [START ON 1/18/2022] ticagrelor  90 mg Oral BID    sodium chloride flush  5-40 mL IntraVENous 2 times per day     Continuous Infusions:    sodium chloride      sodium chloride 125 mL/hr at 01/16/22 1814    sodium chloride       PRN Meds: sodium chloride flush, sodium chloride, acetaminophen, ondansetron, sodium chloride flush, sodium chloride, potassium chloride **OR** potassium alternative oral replacement **OR** potassium chloride, ondansetron, polyethylene glycol, acetaminophen **OR** acetaminophen, nitroGLYCERIN, metoprolol, melatonin    Data:     I/O (24Hr):     Intake/Output Summary (Last 24 hours) at 1/17/2022 1403  Last data filed at 1/17/2022 0631  Gross per 24 hour   Intake 1837 ml   Output 300 ml   Net 1537 ml       Labs:  Hematology:  Recent Labs     01/16/22  1456 01/16/22  1831 01/17/22  0336   WBC 8.4 9.3 10.1   RBC 5.47 5.35 4.91   HGB 14.8 14.7 13.3   HCT 46.3 45.2 41.3   MCV 84.6 84.5 84.1   MCH 27.1 27.5 27.1   MCHC 32.0 32.5 32.2   RDW 12.7 12.7 12.6    313 273   MPV 9.3 9.3 9.5   INR  --  0.9  --      Chemistry:  Recent Labs     01/16/22  1456 01/16/22  1558 01/16/22  1831 01/16/22  1913 01/17/22  0336     --   --   --  139   K 3.9  --   --   --  4.0     --   --   --  107   CO2 26  --   --   --  21   GLUCOSE 100*  --   --   --  103*   BUN 12  --   --   --  9 CREATININE 0.86  --   --   --  0.83   MG  --   --   --   --  2.4   ANIONGAP 12  --   --   --  11   LABGLOM >60  --   --   --  >60   GFRAA >60  --   --   --  >60   CALCIUM 9.3  --   --   --  8.3*   PROBNP 83  --   --   --   --    TROPHS 50*   < > 118* 133* 658*   CKTOTAL  --   --  293  --   --    LACTACIDWB  --   --   --   --  0.8    < > = values in this interval not displayed. Recent Labs     01/16/22  1456 01/17/22  0336 01/17/22  0714   PROT  --  6.3*  --    LABALBU  --  3.6  --    AST  --  57*  --    ALT  --  24  --    ALKPHOS  --  56  --    BILITOT  --  0.24*  --    LIPASE 23  --   --    CHOL  --  179  --    HDL  --  35*  --    LDLCHOLESTEROL  --  121  --    CHOLHDLRATIO  --  5.1*  --    TRIG  --  115  --    VLDL  --  NOT REPORTED  --    POCGLU  --   --  117*     ABG:No results found for: POCPH, PHART, PH, POCPCO2, SCA6AYG, PCO2, POCPO2, PO2ART, PO2, POCHCO3, ZZO1VEM, HCO3, NBEA, PBEA, BEART, BE, THGBART, THB, CTY9AUF, YYGA5VQX, U7DCPFRX, O2SAT, FIO2  Lab Results   Component Value Date/Time    SPECIAL NOT REPORTED 08/10/2021 12:10 PM     Lab Results   Component Value Date/Time    CULTURE NO GROWTH 08/10/2021 12:10 PM       Radiology:  XR CHEST PORTABLE    Result Date: 1/16/2022  No acute process.        Assessment:        Primary Problem  <principal problem not specified>    Active Hospital Problems    Diagnosis Date Noted    Non-ST elevation MI (NSTEMI) [facility] [I21.4] 01/17/2022    Coronary artery disease involving native coronary artery of native heart with unstable angina pectoris ([facility] [I25.110]     Chest pain [R07.9] 01/16/2022    Cannabis abuse [F12.10] 03/06/2016       Plan:        Aspirin  Lipitor  Brilinta  Metoprolol  Cardiology evaluation  Smoking/marijuana abstinence  DVT prophylaxis: Lovenox if ok with CV    IP CONSULT TO SOCIAL WORK  IP CONSULT TO CARDIOLOGY  IP CONSULT TO CARDIOLOGY  IP CONSULT TO INTERNAL MEDICINE  IP CONSULT TO CARDIAC REHAB  IP CONSULT TO [address] Janki Rowe DO  1/17/2022  2:03 PM

## 2022-01-17 NOTE — CARE COORDINATION
Consult received for marijuana counseling  Met with pt who stated he lives alone. Stated he works and pt has [employer] in place. Pt reports drinking alcohol 1x month, 1-2 shots. He reports daily marijuana use. Pt stated he has not used any cocaine (snorting) in 5m. He denied all other drug use. Pt stated he is not concerned about his alcohol/drug use. He denied family/friends being concerned. Pt reports no prior tx or AA/NA/CA involvement. Pt did state he is linked with [facility] for mental health. Stated he speaks with his counselor a few times a week. Pt aware that they also provide substance abuse tx if he should ever decide to quit.   Pt declined further tx resources

## 2022-01-17 NOTE — PROGRESS NOTES
I was assigned to this patient in error. I did not evaluate or treat this patient during this emergency department encounter.     Lindy Rainey DO, PGY-3  Emergency Medicine Resident  1/17/2022     7:13 AM

## 2022-01-17 NOTE — OP NOTE
[facility]    CARDIAC CATHETERIZATION    Date:   1/17/2022  Patient name: Lidia Coleman  Date of admission:  1/16/2022  2:45 PM  MRN:   [id number]  YOB: 1995    Operators:  Primary:   Yadira Jones MD (Attending Physician)    Assistant/CV fellow: Eleuterio Morrison MD      Procedure performed:     [x] Left Heart Catheterization. [] Graft Angiography. [x] Left Ventriculography. [] Right Heart Catheterization. [x] Coronary Angiography. [] Aortic Valve Studies. [] PCI:      [] Other:       Pre Procedure Conscious Sedation Data:  ASA Class:    [] I [x] II [] III [] IV    Mallampati Class:  [] I [x] II [] III [] IV      Indication:  [] STEMI      [] + Stress test  [x] ACS      [] + EKG Changes  [] Non Q MI       [] Significant Risk Factors  [] Recurrent Angina             [] Diabetes Mellitus    [] New LBBB      [] Other.  [] CHF / Low EF changes     [] Abnormal CTA / Ca Score      Procedure:  Access:  [] Femoral  [x] Radial  artery       [x] Right  [] Left    Procedure: After informed consent was obtained with explanation of the risks and benefits, patient was brought to the cath lab. The access area was prepped and draped in sterile fashion. 1% lidocaine was used for local block. The artery was cannulated with 6  Fr sheath with brisk arterial blood return. The side port was frequently flushed and aspirated with normal saline. Estimated Blood Loss:  [x] Minimal < 25 cc [] Moderate 25-50 cc  [] >50 cc    Findings:      LMCA: Normal 0% stenosis. LAD: Multiple aneurysmal area in proximal and mid   D1: Mid 80% stenosis / thrombus   D2: 100% stenosis with PA 0 flow         Lesion on 2nd Diag: Proximal subsection. 100% stenosis 30 mm length     reduced to 100%. Pre procedure PA 0 flow was noted. Post Procedure PA     0 flow was present. Poor runoff was present. The lesion was diagnosed as     High Risk (C). Devices used         - [employer] Prowater Flex 180 cm. Number of passes: 1.         - Euphora Balloon 2.0mm x 12mm. 3 inflation(s) to a max pressure of: 14     bessie. Lesion on 1st Diag: Mid subsection. 75% stenosis 8 mm length reduced to     0%. Pre procedure PA I flow was noted. Post Procedure PA III flow was     present. Good runoff was present. The lesion was diagnosed as High Risk     (C). Comments:Using PTA only     Devices used         - Euphora Balloon 2.0mm x 12mm. 7 inflation(s) to a max pressure of: 12     bessie. Ginger Roche Balloon 3.0mm x 20mm. 5 inflation(s) to a max pressure of: 10     bessie. - Pronto Extraction Catheter LP. Number of passes: 1. LCx: Mild irregularities 10-20%. RCA: Mid 30% stenosis       Conclusions:         Two vessel acute occlusion, most likely thrombotic 100% D2, 80% D1    Successful PTCA pf D1, regain PA III flow,    Unsuccessful PTCA / thrombectomy f D2< with PA flow continue 0 with no    flow. Recommendations:     Post PTCA -Protocol    To start IIB-IIIa antiplatelet    Electronically signed on 1/17/2022 at 1:25 PM by:    Veronica James MD  Fellow, [id number]       I have reviewed the case / procedure with resident / fellow  I have examined the patient personally  Patient agree with treatment plan as discussed before, final arrangement based on my evaluation and exam.    Risk and benefit of procedure planned were explained in details. Procedure was performed by me personally, with all aspect of the procedure being done using standard protocol. Note was modified based on my own assessment and treatment.     Deidre Chavez MD  [facility]

## 2022-01-17 NOTE — CONSULTS
[facility]    Inpatient Consultation Note               Today's Date: 1/17/2022  Patient Name: Jeffery Finnegan  Date of admission: 1/16/2022  2:45 PM  Patient's age: 27 y.o., 1995  Admission Dx: Chest pain [R07.9]  Chest pain, unspecified type [R07.9]  ACS (acute coronary syndrome) ([address] [I24.9]    Reason for  Consult:  Chest pain    Requesting Physician: Tushar Alvarez DO    CHIEF COMPLAINT:  Chest pain   Chief Complaint   Patient presents with    Chest Pain    Shortness of Breath       History Obtained From:  patient    HISTORY OF PRESENT ILLNESS:     The patient is a 27 y.o. male who is admitted to the hospital for chest pain. The patient says he woke up yesterday morning with the chest pain and it progressively worsened throughout the day. The pain is described as low, mid-sternal pressure without radiation. In addition to the chest pain, the patient also endorses mild shortness of breath. There was no inciting event that precipitated these symptoms. There were no worsening or relieving factors. He is not currently experiencing any symptoms. The patient recently had a flu-like illness with body aches, headache, and viral symptoms. He endorses a history of mental illness. According to the patient he has been diagnosed with PTSD, anxiety, depression, and multiple personality disorder. He does have a history of marijuana use. He denies nausea, vomiting, diaphoresis and epigastric pain. He denies experiencing similar chest pain prior to this event. In the ED, EKG showed unspecific ST segment changes and first degree AV block. Troponins were initially elevated to 50 and have since increased to 658. The patient has since been started on aspirin, statin, and heparin infusion. ECHO is pending. Past Medical History:   has a past medical history of Asthma, Hepatitis C, Kidney stone, Major depression, and Marijuana abuse. Past Surgical History:   has no past surgical history on file. Home Medications:    Prior to Admission medications    Medication Sig Start Date End Date Taking?  Authorizing Provider   sertraline (ZOLOFT) 50 MG tablet Take 50 mg by mouth every evening   Yes Historical Provider, MD   acetaminophen (TYLENOL) 500 MG tablet Take 2 tablets by mouth 3 times daily for 7 days 8/10/21 8/17/21  Damir Ellington MD   mirtazapine (REMERON) 30 MG tablet Take 30 mg by mouth nightly    Historical Provider, MD        Current Facility-Administered Medications: amiodarone (CORDARONE) 450 mg in dextrose 5 % 250 mL infusion, 0.5 mg/min, IntraVENous, Continuous  nitroGLYCERIN 50 mg in dextrose 5% 250 mL infusion, 5-200 mcg/min, IntraVENous, Continuous  0.9 % sodium chloride infusion, , IntraVENous, Continuous  sodium chloride flush 0.9 % injection 5-40 mL, 5-40 mL, IntraVENous, 2 times per day  sodium chloride flush 0.9 % injection 5-40 mL, 5-40 mL, IntraVENous, PRN  0.9 % sodium chloride infusion, 25 mL, IntraVENous, PRN  potassium chloride (KLOR-CON M) extended release tablet 40 mEq, 40 mEq, Oral, PRN **OR** potassium bicarb-citric acid (EFFER-K) effervescent tablet 40 mEq, 40 mEq, Oral, PRN **OR** potassium chloride 10 mEq/100 mL IVPB (Peripheral Line), 10 mEq, IntraVENous, PRN  ondansetron (ZOFRAN) injection 4 mg, 4 mg, IntraVENous, Q4H PRN  polyethylene glycol (GLYCOLAX) packet 17 g, 17 g, Oral, Daily PRN  acetaminophen (TYLENOL) tablet 650 mg, 650 mg, Oral, Q6H PRN **OR** acetaminophen (TYLENOL) suppository 650 mg, 650 mg, Rectal, Q6H PRN  heparin (porcine) injection 4,000 Units, 4,000 Units, IntraVENous, PRN  heparin (porcine) injection 2,000 Units, 2,000 Units, IntraVENous, PRN  heparin 25,000 units in dextrose 5% 250 mL (premix) infusion, 5-30 Units/kg/hr, IntraVENous, Continuous  aspirin chewable tablet 81 mg, 81 mg, Oral, Daily  atorvastatin (LIPITOR) tablet 80 mg, 80 mg, Oral, Nightly  nitroGLYCERIN (NITROSTAT) SL tablet 0.4 mg, 0.4 mg, SubLINGual, Q5 Min PRN  metoprolol HCT 45.2 41.3    273     BMP:   Recent Labs     01/16/22  1456 01/17/22  0336    139   K 3.9 4.0   CO2 26 21   BUN 12 9   CREATININE 0.86 0.83   LABGLOM >60 >60   GLUCOSE 100* 103*     Pro-BNP:    Recent Labs     01/16/22  1456   PROBNP 83     BNP: No results for input(s): BNP in the last 72 hours. PT/INR:   Recent Labs     01/16/22  1831   PROTIME 10.2   INR 0.9     APTT:  Recent Labs     01/16/22  1831 01/17/22  0504   APTT 24.5 31.6*     CARDIAC ENZYMES:  Recent Labs     01/16/22  [address]     01/16/22  1831 01/16/22  1913 01/17/22  0336   TROPONINT NOT REPORTED NOT REPORTED NOT REPORTED       FASTING LIPID PANEL:  Lab Results   Component Value Date    HDL 35 01/17/2022    TRIG 115 01/17/2022     LIVER PROFILE:  Recent Labs     01/17/22  0336   AST 57*   ALT 24   LABALBU 3.6         Patient's Active Problem List  Active Problems:    Chest pain    ACS (acute coronary syndrome) (HCC)    Tetrahydrocannabinol (THC) dependence ([address]  Resolved Problems:    * No resolved hospital problems. *        IMPRESSION:    Chest  Pain  Acute coronary syndrome given increasing troponins  Possible perimyocarditis given viral prodrome  SOB  THC abuse      RECOMMENDATIONS:  Plan for cardiac catheterization today given increasing troponins  If negative cardiac cath, likely perimyocarditis given recent viral illness  Will start anti-inflammatory and Colchicine if perimyocarditis is suspected  Continue asa, statin, heparin  D/c nitro, amiodarone  ECHO results pending  Continue cardiac monitoring  Monitor labs and vitals, replace electrolytes as needed      Thank you for allowing us to participate in the care of Felicita Chris. If you have any questions or concerns, please do not hesitate to contact us. Discussed with patient and Nurse.     Arron Harvey, MS4  Cardiovascular Diseases    [address]        Please note that part of this chart were generated using voice recognition  dictation software. Although every effort was made to ensure the accuracy of this automated transcription, some errors in transcription may have occurred. Attestation signed by      Attending Physician Statement:    I have discussed the care of  Sylvain Brownlee , including pertinent history and exam findings, with the Cardiology fellow/resident. I have seen and examined the patient and the key elements of all parts of the encounter have been performed by me. I agree with the assessment, plan and orders as documented by the fellow/resident, after I modified exam findings and plan of treatments, and the final version is my approved version of the assessment. Additional Comments:     69-year-old male with no significant cardiac or medical history admitted with episode of substernal chest discomfort yesterday. Initial ECG showed normal sinus rhythm with no significant ST-T abnormality. High-sensitivity troponins elevated with an upward trend, last level 658. Currently chest pain-free and hemodynamically stable. Does report flulike viral illness 1 week ago. COVID-19 test negative on admission. No family history of premature CAD or sudden cardiac death  Given significantly elevated troponins, recommend coronary angiography to rule out any critical coronary artery stenosis. Possibility of myopericarditis in case no significant CAD. Continue asa, lipitor and heparin drip in the meantime. D/c amiodarone and nitro infusion   Further recommendations post left heart cath     I discussed in detail the risks, benefits, and alternatives to the procedure including but not limited to risk of bleeding/hematoma requiring surgical intervention, contrast induced allergy and/or nephropathy, arrythmia, CVA, MI or death. The patient verbalized understanding and wishes to proceed.

## 2022-01-17 NOTE — PLAN OF CARE
Problem: Pain:  Goal: Pain level will decrease  Description: Pain level will decrease  1/17/2022 0935 by Supirya Davison RN  Outcome: Ongoing  1/17/2022 0631 by Kat Bradford RN  Outcome: Ongoing  Goal: Control of acute pain  Description: Control of acute pain  Outcome: Ongoing  Goal: Control of chronic pain  Description: Control of chronic pain  Outcome: Ongoing     Problem: Falls - Risk of:  Goal: Will remain free from falls  Description: Will remain free from falls  1/17/2022 0935 by Supriya Davison RN  Outcome: Ongoing  1/17/2022 0631 by Kat Bradford RN  Outcome: Ongoing  Goal: Absence of physical injury  Description: Absence of physical injury  Outcome: Ongoing

## 2022-01-17 NOTE — PROGRESS NOTES
Physical Therapy         Physical Therapy Cancel Note      DATE: 2022    NAME: Patricio Ackerman  MRN: [id number]   : 1995      Patient not seen this date for Physical Therapy due to: Other: Pt with elevated troponins at 658. Cardiology consulted, await input.       Electronically signed by Nikolas Reno PT on 2022 at 11:31 AM

## 2022-01-17 NOTE — PROGRESS NOTES
Pt transferred from  for chest pain, increased trops and needing to be started on a heparin drip. Pt hooked up to telemetry, ekg obtained. Pt denies any chest pain. As RN was talking to patient he had a 9 beat run of vtach, pt states he \"felt his heart beating faster and then it stopped\". RN had just obtained ekg prior to this. Cardio fellow and on call NP notified on this and also on a rising trop of 133 at 1946. 2 grams of mag ordered per cardio. When speaking with pt he states he has previously used cocaine in the past but has not used \"recently\", he states he is having a lot of stress in his life right now related to finances and family. Pt is noted to be on phone frequently speaking with someone and getting upset, RN instructed patient to try to not get worked up on the phone and offered any assistance with issues he might be having but pt declined.    Electronically signed by Sergio Leo RN on 1/16/2022 at 10:20 PM

## 2022-01-18 PROBLEM — I25.10 CAD S/P PERCUTANEOUS CORONARY ANGIOPLASTY: Status: ACTIVE | Noted: 2022-01-18

## 2022-01-18 PROBLEM — Z98.61 CAD S/P PERCUTANEOUS CORONARY ANGIOPLASTY: Status: ACTIVE | Noted: 2022-01-18

## 2022-01-18 LAB
ALBUMIN SERPL-MCNC: 3.8 G/DL (ref 3.5–5.2)
ALBUMIN/GLOBULIN RATIO: 1.5 (ref 1–2.5)
ALP BLD-CCNC: 53 U/L (ref 40–129)
ALT SERPL-CCNC: 21 U/L (ref 5–41)
ANION GAP SERPL CALCULATED.3IONS-SCNC: 11 MMOL/L (ref 9–17)
AST SERPL-CCNC: 29 U/L
BILIRUB SERPL-MCNC: 0.61 MG/DL (ref 0.3–1.2)
BUN BLDV-MCNC: 8 MG/DL (ref 6–20)
BUN/CREAT BLD: ABNORMAL (ref 9–20)
CALCIUM SERPL-MCNC: 8.5 MG/DL (ref 8.6–10.4)
CHLORIDE BLD-SCNC: 104 MMOL/L (ref 98–107)
CO2: 21 MMOL/L (ref 20–31)
CREAT SERPL-MCNC: 0.78 MG/DL (ref 0.7–1.2)
EKG ATRIAL RATE: 64 BPM
EKG P AXIS: 70 DEGREES
EKG P-R INTERVAL: 218 MS
EKG Q-T INTERVAL: 394 MS
EKG QRS DURATION: 88 MS
EKG QTC CALCULATION (BAZETT): 406 MS
EKG R AXIS: 62 DEGREES
EKG T AXIS: 109 DEGREES
EKG VENTRICULAR RATE: 64 BPM
GFR AFRICAN AMERICAN: >60 ML/MIN
GFR NON-AFRICAN AMERICAN: >60 ML/MIN
GFR SERPL CREATININE-BSD FRML MDRD: ABNORMAL ML/MIN/{1.73_M2}
GFR SERPL CREATININE-BSD FRML MDRD: ABNORMAL ML/MIN/{1.73_M2}
GLUCOSE BLD-MCNC: 88 MG/DL (ref 70–99)
HCT VFR BLD CALC: 41.6 % (ref 40.7–50.3)
HEMOGLOBIN: 13.4 G/DL (ref 13–17)
MCH RBC QN AUTO: 27.1 PG (ref 25.2–33.5)
MCHC RBC AUTO-ENTMCNC: 32.2 G/DL (ref 28.4–34.8)
MCV RBC AUTO: 84 FL (ref 82.6–102.9)
NRBC AUTOMATED: 0 PER 100 WBC
PDW BLD-RTO: 12.7 % (ref 11.8–14.4)
PLATELET # BLD: 266 K/UL (ref 138–453)
PMV BLD AUTO: 9.7 FL (ref 8.1–13.5)
POTASSIUM SERPL-SCNC: 4.1 MMOL/L (ref 3.7–5.3)
RBC # BLD: 4.95 M/UL (ref 4.21–5.77)
SODIUM BLD-SCNC: 136 MMOL/L (ref 135–144)
TOTAL PROTEIN: 6.3 G/DL (ref 6.4–8.3)
WBC # BLD: 9.8 K/UL (ref 3.5–11.3)

## 2022-01-18 PROCEDURE — 2060000000 HC ICU INTERMEDIATE R&B

## 2022-01-18 PROCEDURE — 6360000002 HC RX W HCPCS: Performed by: INTERNAL MEDICINE

## 2022-01-18 PROCEDURE — 99232 SBSQ HOSP IP/OBS MODERATE 35: CPT | Performed by: INTERNAL MEDICINE

## 2022-01-18 PROCEDURE — 97535 SELF CARE MNGMENT TRAINING: CPT

## 2022-01-18 PROCEDURE — 2580000003 HC RX 258: Performed by: STUDENT IN AN ORGANIZED HEALTH CARE EDUCATION/TRAINING PROGRAM

## 2022-01-18 PROCEDURE — 97165 OT EVAL LOW COMPLEX 30 MIN: CPT

## 2022-01-18 PROCEDURE — 36415 COLL VENOUS BLD VENIPUNCTURE: CPT

## 2022-01-18 PROCEDURE — 6370000000 HC RX 637 (ALT 250 FOR IP): Performed by: STUDENT IN AN ORGANIZED HEALTH CARE EDUCATION/TRAINING PROGRAM

## 2022-01-18 PROCEDURE — 80053 COMPREHEN METABOLIC PANEL: CPT

## 2022-01-18 PROCEDURE — 93005 ELECTROCARDIOGRAM TRACING: CPT | Performed by: INTERNAL MEDICINE

## 2022-01-18 PROCEDURE — 85027 COMPLETE CBC AUTOMATED: CPT

## 2022-01-18 PROCEDURE — 97161 PT EVAL LOW COMPLEX 20 MIN: CPT

## 2022-01-18 RX ADMIN — METOPROLOL TARTRATE 12.5 MG: 25 TABLET ORAL at 09:05

## 2022-01-18 RX ADMIN — TIROFIBAN 0.15 MCG/KG/MIN: 5 INJECTION, SOLUTION INTRAVENOUS at 00:21

## 2022-01-18 RX ADMIN — ASPIRIN 81 MG: 81 TABLET, CHEWABLE ORAL at 08:29

## 2022-01-18 RX ADMIN — METOPROLOL TARTRATE 12.5 MG: 25 TABLET ORAL at 20:45

## 2022-01-18 RX ADMIN — SODIUM CHLORIDE: 9 INJECTION, SOLUTION INTRAVENOUS at 18:41

## 2022-01-18 RX ADMIN — ATORVASTATIN CALCIUM 80 MG: 80 TABLET, FILM COATED ORAL at 20:46

## 2022-01-18 RX ADMIN — SODIUM CHLORIDE, PRESERVATIVE FREE 10 ML: 5 INJECTION INTRAVENOUS at 09:05

## 2022-01-18 RX ADMIN — SODIUM CHLORIDE: 9 INJECTION, SOLUTION INTRAVENOUS at 09:31

## 2022-01-18 RX ADMIN — TICAGRELOR 90 MG: 90 TABLET ORAL at 08:30

## 2022-01-18 NOTE — PROGRESS NOTES
Occupational Therapy   Occupational Therapy Initial Assessment  Date: 2022   Patient Name: Alexander Magana  MRN: [id number]     : 1995    Date of Service: 2022  Chief Complaint   Patient presents with    Chest Pain    Shortness of Breath       Discharge Recommendations:    No therapy recommended at discharge. Assessment   Treatment Diagnosis: chest pain  Prognosis: Good  Decision Making: Low Complexity  Patient Education: pt ed on POC, purpose of eval, importance of movement. good return  No Skilled OT: Independent with functional mobility; Independent with ADL's;No OT goals identified  REQUIRES OT FOLLOW UP: No  Activity Tolerance  Activity Tolerance: Patient Tolerated treatment well  Safety Devices  Safety Devices in place: Yes  Type of devices: Call light within reach; Left in bed  Restraints  Initially in place: No           Patient Diagnosis(es): The primary encounter diagnosis was Chest pain, unspecified type. A diagnosis of Non-ST elevation MI (NSTEMI) ([facility] [address] was also pertinent to this visit. has a past medical history of Asthma, Hepatitis C, Kidney stone, Major depression, and Marijuana abuse.   has no past surgical history on file. Treatment Diagnosis: chest pain      Restrictions  Restrictions/Precautions  Restrictions/Precautions: Up as Tolerated  Required Braces or Orthoses?: No  Position Activity Restriction  Other position/activity restrictions: up as tolerated    Subjective   General  Patient assessed for rehabilitation services?: Yes  Family / Caregiver Present: No  Diagnosis: chest pain  General Comment  Comments: RN ok'd for therapy this morning.  pt agreeable to participate insession and cooperative/pleasant throughout  Patient Currently in Pain: Denies    Social/Functional History  Social/Functional History  Lives With: Alone  Type of Home: Apartment (2nd floor no elevator)  Home Layout: One level  Home Access: Stairs to enter with rails  Entrance Stairs - Number of Steps: \"2 flights\"  Entrance Stairs - Rails: Both  Bathroom Shower/Tub: Walk-in shower  Bathroom Toilet: Standard  Bathroom Equipment: Shower chair  Home Equipment:  (reports not owning or using any DME at a baseline)  ADL Assistance: [address]: Independent  Homemaking Responsibilities: Yes (laundry in pt's apt)  Ambulation Assistance: Independent  Transfer Assistance: Independent  Active : Yes (no car available. takes uber)  Occupation: Unemployed  [address]: chilling, working out, painting       Objective   Vision: Within Functional Limits  Hearing: Within functional limits    Orientation  Overall Orientation Status: Within Functional Limits  Observation/Palpation  Posture: Good  Balance  Sitting Balance: Independent (~8 minutes on eOB)  Standing Balance: Independent  Standing Balance  Time: ~4 minutes  Activity: pt completed functional mobility in hallway to simulate community distances  Comment: pt with no LOB or SOB  Functional Mobility  Functional - Mobility Device: No device  Activity: Other  Assist Level: Independent  ADL  Feeding: Independent  Grooming: Independent  UE Bathing: Independent  LE Bathing: Independent  UE Dressing: Independent  LE Dressing: Independent  Toileting: Independent  Additional Comments: OT facilitated pt in donning gown and B socks while sitting on EOB with no difficulties  Tone RUE  RUE Tone: Normotonic  Tone LUE  LUE Tone: Normotonic  Coordination  Movements Are Fluid And Coordinated: Yes     Bed mobility  Supine to Sit: Independent  Sit to Supine: Independent  Scooting: Independent  Transfers  Sit to stand: Independent  Stand to sit:  Independent     Cognition  Overall Cognitive Status: WFL        Sensation  Overall Sensation Status: WFL        LUE AROM (degrees)  LUE AROM : WFL  Left Hand AROM (degrees)  Left Hand AROM: WFL  RUE AROM (degrees)  RUE AROM : WFL  Right Hand AROM (degrees)  Right Hand AROM: WFL  LUE Strength  Gross LUE Strength: WFL  L Hand General: 5/5  RUE Strength  Gross RUE Strength: WFL  R Hand General: 5/5      Plan   Plan  Times per week: D/C OT    AM-PAC Score        AM-PAC Inpatient Daily Activity Raw Score: 24 (01/18/22 1315)  AM-PAC Inpatient ADL T-Scale Score : 57.54 (01/18/22 1315)  ADL Inpatient CMS 0-100% Score: 0 (01/18/22 1315)  ADL Inpatient CMS G-Code Modifier : [facility] (01/18/22 1315)    Therapy Time   Individual Concurrent Group Co-treatment   Time In 6799         Time Out 0856         Minutes 13         Timed Code Treatment Minutes: Carol 1765, OTR/L

## 2022-01-18 NOTE — PROGRESS NOTES
[facility]   Progress Note                    Date:   1/18/2022  Patient name: Kassi Adames  Date of admission:  1/16/2022  2:45 PM  MRN:   [id number]  YOB: 1995  PCP:    No primary care provider on file. Reason for Admission:  Chest pain [R07.9]  Chest pain, unspecified type [R07.9]  ACS (acute coronary syndrome) ([facility] [address] [I24.9]    Subjective:      Clinical Changes / Abnormalities:    Patient seen and examined at bedside. No acute events overnight. No chest pain or shortness of breath. Laying comfortably in bed. Troponin down trending. Did report some pain in his right wrist post procedure however states it has resolved. Urine output in the last 24 hours:     Intake/Output Summary (Last 24 hours) at 1/18/2022 0741  Last data filed at 1/18/2022 0500  Gross per 24 hour   Intake 1593 ml   Output 1625 ml   Net -32 ml     I/O since admission: +1.5 liters    Medications:   Scheduled Meds:   sodium chloride flush  5-40 mL IntraVENous 2 times per day    atorvastatin  80 mg Oral Nightly    aspirin  81 mg Oral Daily    ticagrelor  90 mg Oral BID    sodium chloride flush  5-40 mL IntraVENous 2 times per day     Continuous Infusions:   sodium chloride      tirofiban 0.15 mcg/kg/min (01/18/22 0021)    sodium chloride 125 mL/hr at 01/17/22 1858    sodium chloride       CBC:   Recent Labs     01/16/22  1831 01/17/22  0336 01/18/22  0240   WBC 9.3 10.1 9.8   HGB 14.7 13.3 13.4    273 266     BMP:    Recent Labs     01/16/22  1456 01/17/22  0336 01/18/22  0240    139 136   K 3.9 4.0 4.1    107 104   CO2 26 21 21   BUN 12 9 8   CREATININE 0.86 0.83 0.78   GLUCOSE 100* 103* 88     Hepatic:   Recent Labs     01/17/22  0336 01/18/22  0240   AST 57* 29   ALT 24 21   BILITOT 0.24* 0.61   ALKPHOS 56 53     Troponin: No results for input(s): TROPONINI in the last 72 hours.       Recent Labs     01/17/22  0336 01/17/22  1415 01/17/22  1827   TROPONINT NOT REPORTED NOT REPORTED NOT REPORTED     BNP:   Recent Labs     22  1456   PROBNP 83      No results for input(s): BNP in the last 72 hours. Lipids:   Recent Labs     22  0336   CHOL 179   HDL 35*     INR:   Recent Labs     22  1831   INR 0.9       Objective:   Vitals: /81   Pulse 68   Temp 98.5 °F (36.9 °C) (Oral)   Resp 18   Ht 6' 1\" (1.854 m)   Wt 203 lb (92.1 kg)   SpO2 98%   BMI 26.78 kg/m²      Constitutional and General Appearance:    alert, cooperative, no distress and appears stated age  Respiratory:  · No for increased work of breathing. · On auscultation: clear to auscultation bilaterally  Cardiovascular:  · The apical impulse is not displaced  · Heart tones are crisp and normal. Regular S1 and S2. No murmurs. Abdomen:   · No masses or tenderness  · Bowel sounds present  Extremities:  ·  No Cyanosis or Clubbing  ·  Lower extremity edema: No  ·  Skin: Warm and dry  Neurological:  · Alert and oriented. Diagnostic Studies:     EK22  SR with 1st degree AV block and septal infarct. ECHO:   pending    Cardiac Angiography: 22  LMCA: Normal 0% stenosis. LAD: Multiple aneurysmal area in proximal and mid   D1: Mid 80% stenosis / thrombus   D2: 100% stenosis with PA 0 flow       Lesion on 2nd Diag: Proximal subsection. 100% stenosis 30 mm length     reduced to 100%. Pre procedure PA 0 flow was noted. Post Procedure PA     0 flow was present. Poor runoff was present. The lesion was diagnosed as     High Risk (C).     LCx: Mild irregularities 10-20%. RCA: Mid 30% stenosis       Conclusions:    Two vessel acute occlusion, most likely thrombotic 100% D2, 80% D1   Successful PTCA pf D1, regain PA III flow, Unsuccessful PTCA / thrombectomy f D2< with PA flow continue 0 with no flow.      Recommendations:  Post PTCA -Protocol   To start IIB-IIIa antiplatelet      Assessment / Acute Cardiac Problems:   1.  CAD s/p PTCA & thrombectomy of D1 with unsuccessful  PTCA/thrombectomy of D2  2. HLD  3. HTN  4. Hx of THC use    Plan of Treatment:   1. Continue aggrastat for 18 hours after PTCA/Thrombectromy  2. Continue ASA and Brilinta  3. Continue Lipitor  4. Start Lopressor 12.5 mg BID as BP and HR can tolerate  5. 2D ECHO pending. Follow up on final read  6. K>4, Mg>2    Kristopher Blunt MD  Fellow, [address] [facility]        Attending Physician Statement  I have discussed the care of Maribell Oneill, including pertinent history and exam findings,  with the cardiology fellow/resident. I have seen and examined the patient and the key elements of all parts of the encounter have been performed by me. I agree with the assessment, plan and orders as documented by the resident with additional recommendations as below:       Post LHC and PCI as above, aneurysmal coronaries with occluded D2. Feeling better. No chest pain or dyspnea. LVEF is mildly reduced. Continue asa and brilinta x 1 year followed by asa and xarelto 2.5 mg bid. Low dose BB. OOB and ambulate. discharge home tomorrow if remains stable.        Phoebe Nielsen MD, [facility] [place], [place]

## 2022-01-18 NOTE — PLAN OF CARE
Patient A/O x 4. Clear on auscultation. RA. SR on ECG. Palpable pulses. Denies any pain. VS, I/O monitored. On NaCl at 125 ml/hour.    Problem: Pain:  Goal: Pain level will decrease  Description: Pain level will decrease  Outcome: Ongoing  Goal: Control of acute pain  Description: Control of acute pain  Outcome: Ongoing  Goal: Control of chronic pain  Description: Control of chronic pain  Outcome: Ongoing     Problem: Falls - Risk of:  Goal: Will remain free from falls  Description: Will remain free from falls  Outcome: Ongoing  Goal: Absence of physical injury  Description: Absence of physical injury  Outcome: Ongoing

## 2022-01-18 NOTE — CARE COORDINATION
Case Management Initial Discharge Plan  Cara Flowers,             Met with:patient to discuss discharge plans. Information verified: address, contacts, phone number, , insurance Yes  Insurance Provider: [facility]     Emergency Contact/Next of Kin name & number: dulce Beach  Who are involved in patient's support system? Family     PCP: No primary care provider on file. Discharge Planning    Living Arrangements:  Family Members     Home is an apartment  2 flights stairs to climb to get into front door,     Patient able to perform ADL's:Independent    Current Services (outpatient & in home) none  DME equipment: none       Is patient receiving oral anticoagulation therapy? No          Potential Assistance Needed: f/u cardiology, establish pcp         Evaluation: yes    Expected Discharge date:  22    Patient expects to be discharged to:   home        Follow Up Appointment: Best Day/ Time: Monday AM    Transportation provider: friends/family   Transportation arrangements needed for discharge: No    Readmission Risk              Risk of Unplanned Readmission:  14             Does patient have a readmission risk score greater than 14?: No  If yes, follow-up appointment must be made within 7 days of discharge.      Goals of Care: safe transition plan       Educated yes on transitional options, provided freedom of choice and are agreeable with plan      Discharge Plan: return home independently           Electronically signed by Olive Lomax RN on 22 at 7:49 PM EST

## 2022-01-18 NOTE — PROGRESS NOTES
Physical Therapy    Facility/Department: [facility]  Initial Assessment    NAME: Julien Crowley  : 1995  MRN: [id number]    Date of Service: 2022  Chief Complaint   Patient presents with    Chest Pain    Shortness of Breath      Discharge Recommendations:   (no additional therapy recommended)   PT Equipment Recommendations  Equipment Needed: No    Assessment   Assessment: Pt performed well at therapy this date. Pt was able to ambulate 300 feet with no device independently, negotiate 4 stairs with a right sided handrail independently. Pt independent with functional mobility and without acute care PT needs, pt is being discharged from PT at this time, PT should be reordered with a change in medical status. Prognosis: Excellent  Decision Making: Low Complexity  PT Education: Transfer Training;PT Role;Functional Mobility Training;Plan of Care;Gait Training;General Safety  REQUIRES PT FOLLOW UP: No  Activity Tolerance  Activity Tolerance: Patient Tolerated treatment well       Patient Diagnosis(es): The primary encounter diagnosis was Chest pain, unspecified type. A diagnosis of Non-ST elevation MI (NSTEMI) ([facility] [address] was also pertinent to this visit. has a past medical history of Asthma, Hepatitis C, Kidney stone, Major depression, and Marijuana abuse.   has no past surgical history on file. Restrictions  Restrictions/Precautions  Restrictions/Precautions: Up as Tolerated  Required Braces or Orthoses?: No  Vision/Hearing  Vision: Within Functional Limits  Hearing: Within functional limits     Subjective  General  Patient assessed for rehabilitation services?: Yes  Response To Previous Treatment: Not applicable  Family / Caregiver Present: No  Follows Commands: Within Functional Limits  Subjective  Subjective: Pt supine in bed and agreeable to therapy, RN agreeable to therapy. Pt pleasant and cooperative throughout today's session.   Pain Screening  Patient Currently in Pain: Denies  Vital Signs  Patient Currently in Pain: Denies       Social/Functional History  Social/Functional History  Lives With: Alone  Type of Home: Apartment (2nd floor no elevator)  Home Layout: One level  Home Access: Stairs to enter with rails  Entrance Stairs - Number of Steps: \"2 flights\"  Entrance Stairs - Rails: Both  Bathroom Shower/Tub: Walk-in shower  Bathroom Toilet: Standard  Bathroom Equipment: Shower chair  Home Equipment:  (reports not owning or using any DME at a baseline)  ADL Assistance: [address] [place] [address] Avenue: Independent  Homemaking Responsibilities: Yes (laundry in pt's apt)  Ambulation Assistance: Independent  Transfer Assistance: Independent  Active : Yes (no car available. takes uber)  Occupation: Unemployed  Leisure & Hobbies: chilling, working out, painting  Cognition   Cognition  Overall Cognitive Status: WFL    Objective     Observation/Palpation  Posture: Good    AROM RLE (degrees)  RLE AROM: WFL  AROM LLE (degrees)  LLE AROM : WFL  AROM RUE (degrees)  RUE AROM : WFL  AROM LUE (degrees)  LUE AROM : WFL  Strength RLE  Strength RLE: WFL  Comment: Grossly 5/5  Strength LLE  Strength LLE: WFL  Comment: Grossly 5/5  Strength RUE  Comment: Co-eval with OT, see OT note for UE detail  Strength LUE  Comment: Co-eval with OT, see OT note for UE detail     Sensation  Overall Sensation Status: WFL (pt denies any numbness/tingling)  Bed mobility  Supine to Sit: Independent  Sit to Supine: Independent  Scooting: Independent  Transfers  Sit to Stand: Independent  Stand to sit:  Independent  Ambulation  Ambulation?: Yes  More Ambulation?: No  Ambulation 1  Surface: level tile  Device: No Device  Assistance: Independent  Quality of Gait: good stability, no LOB  Gait Deviations: None  Distance: 300 feet  Stairs/Curb  Stairs?: Yes  Stairs  # Steps : 4  Stairs Height: 6\"  Rails: Right ascending  Assistance: Independent     Balance  Posture: Good  Sitting - Static: Good  Sitting - Dynamic: Good  Standing - Static: Good  Standing - Dynamic: Good  Comments: standing balance assessed with no device. Plan   Plan  Times per week: Pt independent with functional mobility and without acute care PT needs, pt is being discharged from PT at this time, PT should be reordered with a change in medical status. Safety Devices  Type of devices: Left in bed,Call light within reach  Restraints  Initially in place: No                                                 AM-PAC Score  AM-PAC Inpatient Mobility Raw Score : 24 (01/18/22 1004)  AM-PAC Inpatient T-Scale Score : 61.14 (01/18/22 1004)  Mobility Inpatient CMS 0-100% Score: 0 (01/18/22 1004)  Mobility Inpatient CMS G-Code Modifier : [address] (01/18/22 1004)          Goals  Short term goals  Time Frame for Short term goals: Pt independent with functional mobility and without acute care PT needs, pt is being discharged from PT at this time, PT should be reordered with a change in medical status.        Therapy Time   Individual Concurrent Group Co-treatment   Time In 0843         Time Out 0856         Minutes 13         Timed Code Treatment Minutes: 0 Minutes       Lito Ruelas PT

## 2022-01-19 VITALS
OXYGEN SATURATION: 93 % | TEMPERATURE: 98.7 F | HEIGHT: 73 IN | DIASTOLIC BLOOD PRESSURE: 73 MMHG | HEART RATE: 71 BPM | WEIGHT: 203 LBS | RESPIRATION RATE: 16 BRPM | SYSTOLIC BLOOD PRESSURE: 124 MMHG | BODY MASS INDEX: 26.9 KG/M2

## 2022-01-19 PROCEDURE — 6370000000 HC RX 637 (ALT 250 FOR IP): Performed by: STUDENT IN AN ORGANIZED HEALTH CARE EDUCATION/TRAINING PROGRAM

## 2022-01-19 PROCEDURE — 99238 HOSP IP/OBS DSCHRG MGMT 30/<: CPT | Performed by: INTERNAL MEDICINE

## 2022-01-19 RX ORDER — LISINOPRIL 5 MG/1
5 TABLET ORAL DAILY
Qty: 30 TABLET | Refills: 3 | Status: SHIPPED | OUTPATIENT
Start: 2022-01-20

## 2022-01-19 RX ORDER — METOPROLOL SUCCINATE 25 MG/1
25 TABLET, EXTENDED RELEASE ORAL DAILY
Qty: 30 TABLET | Refills: 3 | Status: SHIPPED | OUTPATIENT
Start: 2022-01-20

## 2022-01-19 RX ORDER — LISINOPRIL 5 MG/1
5 TABLET ORAL DAILY
Status: DISCONTINUED | OUTPATIENT
Start: 2022-01-19 | End: 2022-01-19 | Stop reason: HOSPADM

## 2022-01-19 RX ORDER — ATORVASTATIN CALCIUM 80 MG/1
80 TABLET, FILM COATED ORAL NIGHTLY
Qty: 30 TABLET | Refills: 3 | Status: SHIPPED | OUTPATIENT
Start: 2022-01-19

## 2022-01-19 RX ORDER — METOPROLOL SUCCINATE 25 MG/1
25 TABLET, EXTENDED RELEASE ORAL DAILY
Status: DISCONTINUED | OUTPATIENT
Start: 2022-01-19 | End: 2022-01-19 | Stop reason: HOSPADM

## 2022-01-19 RX ORDER — ASPIRIN 81 MG/1
81 TABLET, CHEWABLE ORAL DAILY
Qty: 30 TABLET | Refills: 3 | Status: SHIPPED | OUTPATIENT
Start: 2022-01-20

## 2022-01-19 RX ADMIN — ASPIRIN 81 MG: 81 TABLET, CHEWABLE ORAL at 09:53

## 2022-01-19 RX ADMIN — METOPROLOL SUCCINATE 25 MG: 25 TABLET, FILM COATED, EXTENDED RELEASE ORAL at 09:58

## 2022-01-19 RX ADMIN — TICAGRELOR 90 MG: 90 TABLET ORAL at 09:53

## 2022-01-19 RX ADMIN — LISINOPRIL 5 MG: 5 TABLET ORAL at 09:58

## 2022-01-19 ASSESSMENT — PAIN SCALES - GENERAL: PAINLEVEL_OUTOF10: 0

## 2022-01-19 NOTE — PROGRESS NOTES
Updated Dr. Rosa Johnson cardiology stated he can go from their standpoint. Asked if medications could be reconciled to see if his medications including Brilinta is covered/ affordable for patient.

## 2022-01-19 NOTE — PROGRESS NOTES
[facility]  Office: [address], DO, Annika Mosleyress, DO, Kings Rzaa, DO, Oseas Late Blood, DO, Mickie Lopez MD, Jace Marsh MD, Brina Zamorano MD, Rj Loya MD, Jeovanny Wong MD, Regi Cotter MD, Rachel Ruelas MD, Melly Gil, DO, Page Ball, DO, Carolann Mcfadden MD,  Unique Will, DO, Jaswinder Carrera MD, Eddie Yarbrough MD, Arminda Stevens MD, Kimberly Martinez MD, Pauline Sandoval MD, Arlene Calvo MD, Geena Blair MD, Trace Sanchez, [facility], [facility], CNP, Alberto Monson, CNP, Melchor Purvis, CNS, Alfredo Osgood, CNP, Emerald Alert, CNP, Whitley Alexander, CNP, Jaspreet Owens, CNP, Dayanara Lange, CNP, Ryan Cardoso PA-C, Cassie Galo, DNP, Keara Amos, [facility], Dannie Segura, CNP, Shi Chatman, CNP, Brian Dukes, CNP, Shay Cox, CNP, Eneida Guzman, CNP, Abeba Salmon, [address] [place] [address]    Progress Note      Name:   Tarsha Benjamin  MRN:     [id number]     [place]:      [de-identified]   Room:   [address] [place] [address] Day:  2  Admit Date:  1/16/2022  2:45 PM    PCP:   No primary care provider on file. Code Status:  Full Code    Subjective:     C/C:   Chief Complaint   Patient presents with    Chest Pain    Shortness of Breath     Interval History Status: improved. Patient resting in bed denies any chest pain shortness of breath. Troponin trending down. He is hemodynamically stable. Brief History:   78-year-old with history of asthma, THC use, hepatitis C presented with chest pain. Was noted to have elevated troponin 50-58. Underwent cardiac cath on 01/17/2022 which showed 100% D2 thrombotic occlusion along with 80% D1.   Underwent successful PTCA of D1 and unsuccessful PTCA thrombectomy of D2.  Review of Systems:     Constitutional:  negative for chills, fevers, sweats  Respiratory:  negative for cough, dyspnea on exertion, shortness of breath, wheezing  Cardiovascular:  negative for chest pain, chest pressure/discomfort, lower extremity edema, palpitations  Gastrointestinal:  negative for abdominal pain, constipation, diarrhea, nausea, vomiting  Neurological:  negative for dizziness, headache    Medications: Allergies:  No Known Allergies    Current Meds:   Scheduled Meds:    metoprolol tartrate  12.5 mg Oral BID    sodium chloride flush  5-40 mL IntraVENous 2 times per day    atorvastatin  80 mg Oral Nightly    aspirin  81 mg Oral Daily    ticagrelor  90 mg Oral BID    sodium chloride flush  5-40 mL IntraVENous 2 times per day     Continuous Infusions:    sodium chloride      sodium chloride 125 mL/hr at 22 1841    sodium chloride       PRN Meds: sodium chloride flush, sodium chloride, acetaminophen, ondansetron, sodium chloride flush, sodium chloride, potassium chloride **OR** potassium alternative oral replacement **OR** potassium chloride, ondansetron, polyethylene glycol, acetaminophen **OR** acetaminophen, nitroGLYCERIN, metoprolol, melatonin    Data:     Past Medical History:   has a past medical history of Asthma, Hepatitis C, Kidney stone, Major depression, and Marijuana abuse. Social History:   reports that he has never smoked. He has never used smokeless tobacco. He reports current drug use. Frequency: 7.00 times per week. Drug: Marijuana Leah Peals). He reports that he does not drink alcohol. Family History:   Family History   Problem Relation Age of Onset    No Known Problems Mother     No Known Problems Father        Vitals:  /84   Pulse 73   Temp 97.7 °F (36.5 °C) (Oral)   Resp 18   Ht 6' 1\" (1.854 m)   Wt 203 lb (92.1 kg)   SpO2 96%   BMI 26.78 kg/m²   Temp (24hrs), Av.5 °F (36.9 °C), Min:97.7 °F (36.5 °C), Max:99.1 °F (37.3 °C)    Recent Labs     22  0714   POCGLU 117*       I/O (24Hr):     Intake/Output Summary (Last 24 hours) at 2022 2347  Last data filed at 2022 1700  Gross per 24 hour   Intake 5594 ml   Output 850 ml   Net 4744 ml Labs:  Hematology:  Recent Labs     01/16/22 1831 01/17/22  0336 01/18/22  0240   WBC 9.3 10.1 9.8   RBC 5.35 4.91 4.95   HGB 14.7 13.3 13.4   HCT 45.2 41.3 41.6   MCV 84.5 84.1 84.0   MCH 27.5 27.1 27.1   MCHC 32.5 32.2 32.2   RDW 12.7 12.6 12.7    273 266   MPV 9.3 9.5 9.7   INR 0.9  --   --      Chemistry:  Recent Labs     01/16/22  1456 01/16/22  1558 01/16/22  1831 01/16/22  1913 01/17/22  0336 01/17/22  1415 01/17/22  1827 01/18/22  0240     --   --   --  139  --   --  136   K 3.9  --   --   --  4.0  --   --  4.1     --   --   --  107  --   --  104   CO2 26  --   --   --  21  --   --  21   GLUCOSE 100*  --   --   --  103*  --   --  88   BUN 12  --   --   --  9  --   --  8   CREATININE 0.86  --   --   --  0.83  --   --  0.78   MG  --   --   --   --  2.4  --   --   --    ANIONGAP 12  --   --   --  11  --   --  11   LABGLOM >60  --   --   --  >60  --   --  >60   GFRAA >60  --   --   --  >60  --   --  >60   CALCIUM 9.3  --   --   --  8.3*  --   --  8.5*   PROBNP 83  --   --   --   --   --   --   --    TROPHS 50*   < > 118*   < > 658* 626* 426*  --    CKTOTAL  --   --  293  --   --   --   --   --    LACTACIDWB  --   --   --   --  0.8  --   --   --     < > = values in this interval not displayed. Recent Labs     01/16/22  1456 01/17/22  0336 01/17/22  0714 01/18/22  0240   PROT  --  6.3*  --  6.3*   LABALBU  --  3.6  --  3.8   AST  --  57*  --  29   ALT  --  24  --  21   ALKPHOS  --  56  --  53   BILITOT  --  0.24*  --  0.61   LIPASE 23  --   --   --    CHOL  --  179  --   --    HDL  --  35*  --   --    LDLCHOLESTEROL  --  121  --   --    CHOLHDLRATIO  --  5.1*  --   --    TRIG  --  115  --   --    VLDL  --  NOT REPORTED  --   --    POCGLU  --   --  117*  --          Radiology:  XR CHEST PORTABLE    Result Date: 1/16/2022  No acute process.        Physical Examination:        General appearance:  alert, cooperative and no distress  Mental Status:  oriented to person, place and time and normal affect  Lungs:  clear to auscultation bilaterally, normal effort  Heart:  regular rate and rhythm, no murmur  Abdomen:  soft, nontender, nondistended, normal bowel sounds, no masses, hepatomegaly, splenomegaly  Extremities:  no edema, redness, tenderness in the calves  Skin:  no gross lesions, rashes, induration    Assessment:        Hospital Problems           Last Modified POA    * (Principal) CAD S/P percutaneous coronary angioplasty 1/18/2022 Yes    Cannabis abuse 1/17/2022 Yes    Chest pain 1/16/2022 Yes    Non-ST elevation MI (NSTEMI) ([facility] 1/17/2022 Yes    Coronary artery disease involving native coronary artery of native heart with unstable angina pectoris ([facility] 1/17/2022 Yes          Plan:      -Was on Aggrastat for 18 hours after PTCA. Continue aspirin , Brilinta and statin. Was started on beta-blocker per cardiology this morning. Repeat 2D echo pending from this morning. Cardiology recommending aspirin Brilinta for 1 year followed by aspirin and Xarelto 2.5 twice daily. -plan to d.c in AM if remains stable.     Akua Chambers MD  1/18/2022

## 2022-01-19 NOTE — DISCHARGE SUMMARY
[facility]  Office: [address], DO, Shruti Lanza, DO, Dulce Gibson, DO, Sepideh Esposito, DO, Brain Dancer, MD, José Pérez MD, Nedra Tirado MD, Sekou Blunt MD, Barbi Milan MD, Burgess Justin MD, Aaliyah Dawkins MD, Nasir Dunham, DO, Erika Denise DO, Sherley Adames MD,  Tucker Mansfield DO, Tiffanie Gonzalez MD, Quincy García MD, Anjel Sousa MD, Luz Maria Mendoza MD, Johann Ortiz MD, Tosha Thrasher MD, Trent Swift MD, Dwight Greco [facility], [facility], [facility], Marcel Carreon, CNP, Nam Grace, CNS, Ja Gee, CNP, Irma Bashir, CNP, Harrison Rivas, CNP, Maris Ford, CNP, Author Eli, [facility], Kary Esposito PA-C, Sanchez Medina, [facility], Jhonathan Rizo, [facility], Ingris Lopes, CNP, Jocy Diego, CNP, Trisha Meza, CNP, Mari aLuz Ty, CNP, Charles Zhang, CNP, Howard Hayden, [facility]    Discharge Summary     Patient ID: Art Jovana  :  1995   MRN: [id number]     ACCOUNT:  [de-identified]   Patient's PCP: No primary care provider on file. Admit Date: 2022   Discharge Date: 2022 Staci Chahal of Stay: 3  Code Status:  Full Code  Admitting Physician: Timmy Norton DO  Discharge Physician: Barbi Milan MD     Active Discharge Diagnoses:     Hospital Problem Lists:  Principal Problem:    CAD S/P percutaneous coronary angioplasty  Active Problems:    Cannabis abuse    Chest pain    Non-ST elevation MI (NSTEMI) [facility])    Coronary artery disease involving native coronary artery of native heart with unstable angina pectoris ([facility]  Resolved Problems:    * No resolved hospital problems. *      Admission Condition:  fair   Discharged Condition: good    Hospital Stay:     Hospital Course: 47-year-old with history of asthma, THC use, hepatitis C presented with chest pain. Was noted to have elevated troponin 50-58.    Underwent cardiac cath on 2022 which showed 100% D2 thrombotic occlusion along with 80% D1. Underwent successful PTCA of D1 and unsuccessful PTCA thrombectomy of D2. Was on Aggrastat for 18 hours after PTCA. . To continue aspirin , beta blocker, Brilinta and statin. Cardiology recommending aspirin Brilinta for 1 year followed by aspirin and Xarelto 2.5 twice daily. On the day of discharge patient is alert oriented x3,Denies chest pain shortness of breath. Ambulating in room without any issues. Good b/l air entry, clear lungs, regular S1-S2 no murmurs. Abdomen soft good bowel sounds. Radiology:  XR CHEST PORTABLE    Result Date: 1/16/2022  No acute process. Consultations:    Consults:     Final Specialist Recommendations/Findings:   IP CONSULT TO SOCIAL WORK  IP CONSULT TO CARDIOLOGY  IP CONSULT TO CARDIOLOGY  IP CONSULT TO INTERNAL MEDICINE  IP CONSULT TO CARDIAC REHAB  IP CONSULT TO SOCIAL WORK  IP CONSULT TO CARDIAC REHAB      The patient was seen and examined on day of discharge and this discharge summary is in conjunction with any daily progress note from day of discharge.     Discharge plan:     Disposition: Home    Physician Follow Up:   Luis Miguel Jaimes MD  [address] [place] [id number]  [phone]      Cardiology -- Wednesday, February 9 at 9:00 at [facility]. office       Diet: cardiac diet    Activity: As tolerated  Discharge Medications:      Medication List      START taking these medications    aspirin 81 MG chewable tablet  Take 1 tablet by mouth daily  Start taking on: January 20, 2022     atorvastatin 80 MG tablet  Commonly known as: LIPITOR  Take 1 tablet by mouth nightly     lisinopril 5 MG tablet  Commonly known as: PRINIVIL;ZESTRIL  Take 1 tablet by mouth daily  Start taking on: January 20, 2022     metoprolol succinate 25 MG extended release tablet  Commonly known as: TOPROL XL  Take 1 tablet by mouth daily  Start taking on: January 20, 2022     ticagrelor 90 MG Tabs tablet  Commonly known as: BRILINTA  Take 1 tablet by mouth 2 times daily        CONTINUE taking these medications    mirtazapine 30 MG tablet  Commonly known as: REMERON     sertraline 50 MG tablet  Commonly known as: ZOLOFT        STOP taking these medications    acetaminophen 500 MG tablet  Commonly known as: TYLENOL           Where to Get Your Medications      These medications were sent to [facility] [address] [facility], [address] [place] [address], [place] [address]    Phone: [phone]   aspirin 81 MG chewable tablet  atorvastatin 80 MG tablet  lisinopril 5 MG tablet  metoprolol succinate 25 MG extended release tablet  ticagrelor 90 MG Tabs tablet         Discharge Procedure [address]   Referral Priority: Routine Referral Type: Eval and Treat   Referral Reason: Specialty Services Required   Requested Specialty: Cardiac Rehabilitation   Number of Visits Requested: 1       Time Spent on discharge is  20 mins in patient examination, evaluation, counseling as well as medication reconciliation, prescriptions for required medications, discharge plan and follow up. Electronically signed by   Kenzie Gómez MD  1/19/2022  11:09 AM      Thank you Dr. Chaudhry primary care provider on file. for the opportunity to be involved in this patient's care.

## 2022-01-19 NOTE — PROGRESS NOTES
CLINICAL PHARMACY NOTE: MEDS TO BEDS    Total # of Prescriptions Filled: 5   The following medications were delivered to the patient:  · BRILINTA 90  · METOPROLOL SUCC ER 25   · ATORVASTATIN 80   · ASA CHEW   · LISINOPRIL 5  Additional Documentation:    MEDS DELIVERED TO PT IN ROOM 1002, NO COPAY

## 2022-01-19 NOTE — PROGRESS NOTES
[facility]   Progress Note                    Date:   1/19/2022  Patient name: Girma Moreland  Date of admission:  1/16/2022  2:45 PM  MRN:   [id number]  YOB: 1995  PCP:    No primary care provider on file. Reason for Admission:  Chest pain [R07.9]  Chest pain, unspecified type [R07.9]  [facility] [I24.9]    Subjective:      Clinical Changes / Abnormalities:    No acute events overnight. No CP/SOB. Urine output in the last 24 hours:     Intake/Output Summary (Last 24 hours) at 1/19/2022 0742  Last data filed at 1/19/2022 0532  Gross per 24 hour   Intake 1000 ml   Output 2150 ml   Net -1150 ml       Medications:   Scheduled Meds:   metoprolol tartrate  12.5 mg Oral BID    sodium chloride flush  5-40 mL IntraVENous 2 times per day    atorvastatin  80 mg Oral Nightly    aspirin  81 mg Oral Daily    ticagrelor  90 mg Oral BID    sodium chloride flush  5-40 mL IntraVENous 2 times per day     Continuous Infusions:   sodium chloride      sodium chloride 125 mL/hr at 01/18/22 1841    sodium chloride       CBC:   Recent Labs     01/16/22  1831 01/17/22  0336 01/18/22  0240   WBC 9.3 10.1 9.8   HGB 14.7 13.3 13.4    273 266     BMP:    Recent Labs     01/16/22  1456 01/17/22  0336 01/18/22  0240    139 136   K 3.9 4.0 4.1    107 104   CO2 26 21 21   BUN 12 9 8   CREATININE 0.86 0.83 0.78   GLUCOSE 100* 103* 88     Hepatic:   Recent Labs     01/17/22  0336 01/18/22  0240   AST 57* 29   ALT 24 21   BILITOT 0.24* 0.61   ALKPHOS 56 53     Troponin: No results for input(s): TROPONINI in the last 72 hours. Recent Labs     01/17/22  0336 01/17/22  1415 01/17/22  1827   TROPONINT NOT REPORTED NOT REPORTED NOT REPORTED     BNP:   Recent Labs     01/16/22  1456   PROBNP 83      No results for input(s): BNP in the last 72 hours.   Lipids:   Recent Labs     01/17/22  0336   CHOL 179   HDL 35*     INR:   Recent Labs     01/16/22  1831   INR 0.9       Objective:   Vitals: /73   Pulse 71   Temp 98.7 °F (37.1 °C) (Oral)   Resp 16   Ht 6' 1\" (1.854 m)   Wt 203 lb (92.1 kg)   SpO2 93%   BMI 26.78 kg/m²      Constitutional and General Appearance:    alert, cooperative, no distress and appears stated age  Respiratory:  · No for increased work of breathing. · On auscultation: clear to auscultation bilaterally  Cardiovascular:  · The apical impulse is not displaced  · Heart tones are crisp and normal. Regular S1 and S2. No murmurs. Abdomen:   · No masses or tenderness  · Bowel sounds present  Extremities:  ·  No Cyanosis or Clubbing  ·  Lower extremity edema: No  ·  Skin: Warm and dry  Neurological:  · Alert and oriented. Diagnostic Studies:     EK22  SR with 1st degree AV block and septal infarct. ECHO:  22  Summary  Left ventricle is normal in size, global left ventricular systolic function  is mildly reduced, estimated EF 40-45%, calculated ejection fraction is 50%. Normal right ventricular size and function. Mild tricuspid regurgitation. Estimated right ventricular systolic pressure is 24 mmHg. No significant pericardial effusion is seen. Cardiac Angiography: 22  LMCA: Normal 0% stenosis. LAD: Multiple aneurysmal area in proximal and mid   D1: Mid 80% stenosis / thrombus   D2: 100% stenosis with PA 0 flow       Lesion on 2nd Diag: Proximal subsection. 100% stenosis 30 mm length     reduced to 100%. Pre procedure PA 0 flow was noted. Post Procedure PA     0 flow was present. Poor runoff was present. The lesion was diagnosed as     High Risk (C).     LCx: Mild irregularities 10-20%.    RCA: Mid 30% stenosis       Conclusions:    Two vessel acute occlusion, most likely thrombotic 100% D2, 80% D1   Successful PTCA pf D1, regain PA III flow, Unsuccessful PTCA / thrombectomy f D2< with PA flow continue 0 with no flow.      Recommendations:  Post PTCA -Protocol   To start IIB-IIIa antiplatelet      Assessment / Acute Cardiac Problems:   1. CAD s/p PTCA & thrombectomy of D1 with unsuccessful  PTCA/thrombectomy of D2  2. Aneurysmal coronaries  3. HLD  4. HTN  5. ICM  6. HFrEF 40-45%  7. Hx of THC use    Plan of Treatment:   1. Continue ASA and Brilinta for 1 year followed by asa and xarelto 2.5 mg bid. 2. Continue Lipitor  3. Continue Toprol 25 mg qd. Start lisinopril 5 mg qd.   4. Outpatient follow up with cardiology in 2-4 weeks. James Vance MD  Fellow, Cardiovascular Diseases    [facility]        Attending Physician Statement  I have discussed the care of Patricio Ackerman, including pertinent history and exam findings,  with the cardiology fellow/resident. I have seen and examined the patient and the key elements of all parts of the encounter have been performed by me. I agree with the assessment, plan and orders as documented by the resident with additional recommendations as below:     Remains stable, no recurrence of chest pain, ambulating with no dyspnea. Ok to d.c home today. OP follow up in [facility] office in 2 weeks. Cardiac rehab referral. counseled regarding importance of medication compliance and marijuana use cessation.      Doron Garrett MD, [address] [facility]

## 2022-01-19 NOTE — CARE COORDINATION
Discharge [address] [facility] Case Management Department  Written by: Benji Rachel RN    Patient Name: Jessica Sports  Attending Provider: Jerson Sparks MD  Admit Date: 2022  2:45 PM  MRN: [id number]  Account: [de-identified]                     : 1995  Discharge Date:  2022        Disposition: home, given Vinayak Rachel RN

## 2022-01-19 NOTE — FLOWSHEET NOTE
Assessment: Patient is a 27 y.o. male who was admitted to the hospital due to \"chest pain,\" and \"shortness of breath. \" Per report, patient underwent a procedure to have a \"stent placed. \"    Intervention:  visited patient per initial rounding visits.  introduced herself to patient and learned about his well-being. Patient indicated that he has \"never had heart issues,\" and is coping okay tonight. Patient confirmed that his family members are on their way to the hospital now to visit with him.  provided support and offered encouragement to patient.  encouraged him to reach out to spiritual care throughout his hospitalization for further support. Patient thanked  for visit. Outcome: Patient was receptive of 's visit and support. Plan: Chaplains can make follow-up visit, per request. Claudeen Chant can be reached 24/7 via [employer]. Juan Blanton       01/18/22 2126   Encounter Summary   Services provided to: Patient   Referral/Consult From: [facility] Parent; Family members   Place of [address] [place] [address] Visiting   (1/18/2022)   Complexity of Encounter Low   Length of Encounter 15 minutes   Spiritual Assessment Completed Yes   Routine   Type Initial   Spiritual/Restorationist   Type Spiritual support   Assessment Calm; Approachable;Coping   Intervention Sustaining presence/ Ministry of presence   Outcome Receptive

## 2022-03-02 ENCOUNTER — HOSPITAL ENCOUNTER (OUTPATIENT)
Dept: CARDIAC REHAB | Age: 27
Setting detail: THERAPIES SERIES
Discharge: HOME | End: 2022-03-02

## 2022-03-02 NOTE — PROGRESS NOTES
Pt was no show no call for Cardiac Rehab today, called pt, states he had interviews today, states doesn't really have transportation to get to [facility]. Will close referral at this time.

## 2022-09-02 ENCOUNTER — HOSPITAL ENCOUNTER (EMERGENCY)
Age: 27
Discharge: HOME | End: 2022-09-02
Attending: EMERGENCY MEDICINE
Payer: MEDICAID

## 2022-09-02 VITALS
SYSTOLIC BLOOD PRESSURE: 122 MMHG | OXYGEN SATURATION: 97 % | TEMPERATURE: 98.2 F | DIASTOLIC BLOOD PRESSURE: 76 MMHG | RESPIRATION RATE: 17 BRPM | HEART RATE: 70 BPM

## 2022-09-02 DIAGNOSIS — R30.0 DYSURIA: Primary | ICD-10-CM

## 2022-09-02 PROCEDURE — 81003 URINALYSIS AUTO W/O SCOPE: CPT

## 2022-09-02 PROCEDURE — 99284 EMERGENCY DEPT VISIT MOD MDM: CPT

## 2022-09-02 PROCEDURE — 87491 CHLMYD TRACH DNA AMP PROBE: CPT

## 2022-09-02 PROCEDURE — 87591 N.GONORRHOEAE DNA AMP PROB: CPT

## 2022-09-02 PROCEDURE — 87661 TRICHOMONAS VAGINALIS AMPLIF: CPT

## 2022-09-02 PROCEDURE — 96372 THER/PROPH/DIAG INJ SC/IM: CPT

## 2022-09-02 PROCEDURE — 6370000000 HC RX 637 (ALT 250 FOR IP): Performed by: STUDENT IN AN ORGANIZED HEALTH CARE EDUCATION/TRAINING PROGRAM

## 2022-09-02 PROCEDURE — 6360000002 HC RX W HCPCS: Performed by: STUDENT IN AN ORGANIZED HEALTH CARE EDUCATION/TRAINING PROGRAM

## 2022-09-02 RX ORDER — DOXYCYCLINE HYCLATE 100 MG
100 TABLET ORAL 2 TIMES DAILY
Qty: 14 TABLET | Refills: 0 | Status: SHIPPED | OUTPATIENT
Start: 2022-09-02 | End: 2022-09-09

## 2022-09-02 RX ORDER — CEFTRIAXONE 500 MG/1
500 INJECTION, POWDER, FOR SOLUTION INTRAMUSCULAR; INTRAVENOUS ONCE
Status: COMPLETED | OUTPATIENT
Start: 2022-09-02 | End: 2022-09-02

## 2022-09-02 RX ORDER — METRONIDAZOLE 500 MG/1
2000 TABLET ORAL ONCE
Status: COMPLETED | OUTPATIENT
Start: 2022-09-02 | End: 2022-09-02

## 2022-09-02 RX ORDER — DOXYCYCLINE HYCLATE 100 MG
100 TABLET ORAL ONCE
Status: COMPLETED | OUTPATIENT
Start: 2022-09-02 | End: 2022-09-02

## 2022-09-02 RX ORDER — ONDANSETRON 4 MG/1
4 TABLET, FILM COATED ORAL ONCE
Status: COMPLETED | OUTPATIENT
Start: 2022-09-02 | End: 2022-09-02

## 2022-09-02 RX ADMIN — CEFTRIAXONE SODIUM 500 MG: 500 INJECTION, POWDER, FOR SOLUTION INTRAMUSCULAR; INTRAVENOUS at 21:37

## 2022-09-02 RX ADMIN — METRONIDAZOLE 2000 MG: 500 TABLET, FILM COATED ORAL at 21:38

## 2022-09-02 RX ADMIN — ONDANSETRON HYDROCHLORIDE 4 MG: 4 TABLET, FILM COATED ORAL at 21:38

## 2022-09-02 RX ADMIN — DOXYCYCLINE HYCLATE 100 MG: 100 TABLET, COATED ORAL at 21:38

## 2022-09-02 ASSESSMENT — PAIN - FUNCTIONAL ASSESSMENT: PAIN_FUNCTIONAL_ASSESSMENT: NONE - DENIES PAIN

## 2022-09-02 NOTE — ED PROVIDER NOTES
[facility]  ED  Emergency Department Encounter  Emergency Medicine Resident     Pt Name:Venancio Person  MRN: [id number]  [place] 1995  Date of evaluation: 9/2/22  PCP:  No primary care provider on file. CHIEF COMPLAINT       Chief Complaint   Patient presents with    Other     Pt stated his dog urinated in his lap yesterday and now has discomfort in genital area    Dysuria       HISTORY OF PRESENT ILLNESS  (Location/Symptom, Timing/Onset, Context/Setting, Quality, Duration, Modifying Factors, Severity.)      Margie Smiley is a 32 y.o. male who presents with burning upon urination for the last 2 to 3 days. Patient states he initially thought this was due to a possible STI exposure from the partner that he was with. However he also reports that the symptoms started after his dog peed on him. He denies any systemic signs including fever, nausea, vomiting or flank pain. Denies any significant medical conditions. Not currently on any daily medications. PAST MEDICAL / SURGICAL / SOCIAL / FAMILY HISTORY      has a past medical history of Asthma, Hepatitis C, Kidney stone, Major depression, Marijuana abuse, and NSTEMI (non-ST elevated myocardial infarction) ([facility] [address] has no past surgical history on file.       Social History     Socioeconomic History    Marital status: Single     Spouse name: Not on file    Number of children: Not on file    Years of education: Not on file    Highest education level: Not on file   Occupational History    Not on file   Tobacco Use    Smoking status: Never    Smokeless tobacco: Never   Vaping Use    Vaping Use: Never used   Substance and Sexual Activity    Alcohol use: No    Drug use: Yes     Frequency: 7.0 times per week     Types: Marijuana Cormier Siria)    Sexual activity: Yes     Partners: Female   Other Topics Concern    Not on file   Social History Narrative    Not on file     Social Determinants of Health     Financial Resource Strain: Not on file   Food Insecurity: Not on file   Transportation Needs: Not on file   Physical Activity: Not on file   Stress: Not on file   Social Connections: Not on file   Intimate Partner Violence: Not on file   Housing Stability: Not on file       Family History   Problem Relation Age of Onset    No Known Problems Mother     No Known Problems Father        Allergies:  Patient has no known allergies. Home Medications:  Prior to Admission medications    Medication Sig Start Date End Date Taking? Authorizing Provider   doxycycline hyclate (VIBRA-TABS) 100 MG tablet Take 1 tablet by mouth 2 times daily for 7 days 9/2/22 9/9/22 Yes Sintia Knott,    aspirin 81 MG chewable tablet Take 1 tablet by mouth daily 1/20/22   Americo Estrada MD   atorvastatin (LIPITOR) 80 MG tablet Take 1 tablet by mouth nightly 1/19/22   Americo Estrada MD   lisinopril (PRINIVIL;ZESTRIL) 5 MG tablet Take 1 tablet by mouth daily 1/20/22   Americo Estrada MD   metoprolol succinate (TOPROL XL) 25 MG extended release tablet Take 1 tablet by mouth daily 1/20/22   Americo Estrada MD   ticagrelor (BRILINTA) 90 MG TABS tablet Take 1 tablet by mouth 2 times daily 1/19/22   Americo Estrada MD   sertraline (ZOLOFT) 50 MG tablet Take 50 mg by mouth every evening    Historical Provider, MD   mirtazapine (REMERON) 30 MG tablet Take 30 mg by mouth nightly    Historical Provider, MD       REVIEW OF SYSTEMS    (2-9 systems for level 4, 10 or more for level 5)      Review of Systems   Constitutional:  Negative for activity change, chills and fever. HENT:  Negative for congestion, sinus pressure, sinus pain and sore throat. Eyes:  Negative for pain and itching. Respiratory:  Negative for cough and shortness of breath. Cardiovascular:  Negative for chest pain. Gastrointestinal:  Negative for abdominal distention, abdominal pain, constipation, diarrhea and nausea. Endocrine: Negative for polyuria. Genitourinary:  Positive for dysuria and frequency.  Negative for genital sores, hematuria, penile discharge, penile swelling, scrotal swelling and testicular pain. Musculoskeletal:  Negative for arthralgias. Skin:  Negative for rash. Neurological:  Negative for light-headedness and headaches. PHYSICAL EXAM   (up to 7 for level 4, 8 or more for level 5)      INITIAL VITALS:   /76   Pulse 70   Temp 98.2 °F (36.8 °C) (Oral)   Resp 17   SpO2 97%     Physical Exam  Vitals reviewed. Exam conducted with a chaperone present. Constitutional:       General: He is not in acute distress. HENT:      Head: Normocephalic and atraumatic. Ears:      Comments: Hearing grossly normal     Nose: Nose normal.      Mouth/Throat:      Mouth: Mucous membranes are moist.      Pharynx: Oropharynx is clear. Eyes:      General: No scleral icterus. Conjunctiva/sclera: Conjunctivae normal.      Pupils: Pupils are equal, round, and reactive to light. Cardiovascular:      Rate and Rhythm: Normal rate and regular rhythm. Pulses: Normal pulses. Pulmonary:      Effort: Pulmonary effort is normal. No respiratory distress. Breath sounds: Normal breath sounds. Abdominal:      General: There is no distension. Tenderness: There is no abdominal tenderness. There is no guarding. Genitourinary:     Comments: Testes nontender, no genital ulcers or rashes  Musculoskeletal:      Cervical back: No muscular tenderness. Right lower leg: No edema. Left lower leg: No edema. Skin:     General: Skin is warm and dry. Capillary Refill: Capillary refill takes less than 2 seconds. Neurological:      General: No focal deficit present. Mental Status: He is alert and oriented to person, place, and time. Mental status is at baseline.        DIFFERENTIAL  DIAGNOSIS     PLAN (LABS / IMAGING / EKG):  Orders Placed This Encounter   Procedures    C.trachomatis N.gonorrhoeae DNA, Urine    Urinalysis with Reflex to Culture       MEDICATIONS ORDERED:  Orders Placed This Encounter   Medications    cefTRIAXone (ROCEPHIN) injection 500 mg     Order Specific Question:   Antimicrobial Indications     Answer:   STD infection    doxycycline hyclate (VIBRA-TABS) tablet 100 mg     Order Specific Question:   Antimicrobial Indications     Answer:   STD infection    doxycycline hyclate (VIBRA-TABS) 100 MG tablet     Sig: Take 1 tablet by mouth 2 times daily for 7 days     Dispense:  14 tablet     Refill:  0    metroNIDAZOLE (FLAGYL) tablet 2,000 mg     Order Specific Question:   Antimicrobial Indications     Answer:   STD infection    ondansetron (ZOFRAN) tablet 4 mg         DIAGNOSTIC RESULTS / EMERGENCY DEPARTMENT COURSE / MDM   LAB RESULTS:  Results for orders placed or performed during the hospital encounter of 09/02/22   Urinalysis with Reflex to Culture    Specimen: Urine   Result Value Ref Range    Color, UA Yellow Yellow    Turbidity UA Clear Clear    Glucose, Ur NEGATIVE NEGATIVE    Bilirubin Urine NEGATIVE NEGATIVE    Ketones, Urine NEGATIVE NEGATIVE    Specific Gravity, UA 1.028 1.005 - 1.030    Urine Hgb NEGATIVE NEGATIVE    pH, UA 5.5 5.0 - 8.0    Protein, UA NEGATIVE NEGATIVE    Urobilinogen, Urine Normal Normal    Nitrite, Urine NEGATIVE NEGATIVE    Leukocyte Esterase, Urine NEGATIVE NEGATIVE    Urinalysis Comments       Microscopic exam not performed based on chemical results unless requested in original order. IMPRESSION: Leora Rincon is a 32 y.o. man presenting for STI exposure and dysuria. Urine is negativbe. Pt was treated empirically for STI and rec for dc. No systemic signs. Doubt nephrolithiasis or testicular torsion    RADIOLOGY:  No orders to display         EKG  none    All EKG's are interpreted by the Emergency Department Physician who either signs or Co-signs this chart in the absence of a cardiologist.    EMERGENCY DEPARTMENT COURSE:      ED Course as of 09/03/22 1158   Fri Sep 02, 2022   2034 Normal  exam w/chaperone.  No ulcers, rashes or testicular tenderness [LR]   2117 Leukocyte Esterase, Urine: NEGATIVE [LR]   2118 Nitrite, Urine: NEGATIVE [LR]      ED Course User Index  [LR] Sintia Knott DO   Discussed findings w/pt. Treated empirically for STI. Patient understands to return to the emergency department for any new or worsening symptoms and to see their PCP regarding hospital follow up. No notes of EC Admission Criteria type on file. PROCEDURES:  none    CONSULTS:  None    CRITICAL CARE:  See attending note        FINAL IMPRESSION      1.  Dysuria          DISPOSITION / PLAN     DISPOSITION Decision To Discharge 09/02/2022 09:19:58 PM      PATIENT REFERRED TO:  [facility]  [address] [address]  [phone]    As needed, If symptoms worsen    DISCHARGE MEDICATIONS:  Discharge Medication List as of 9/2/2022  9:24 PM        START taking these medications    Details   doxycycline hyclate (VIBRA-TABS) 100 MG tablet Take 1 tablet by mouth 2 times daily for 7 days, Disp-14 tablet, R-0Print             Isaias Hutton DO  Emergency Medicine Resident    (Please note that portions of thisnote were completed with a voice recognition program.  Efforts were made to edit the dictations but occasionally words are mis-transcribed.)      Isaias Hutton DO  Resident  09/03/22 1200

## 2022-09-03 LAB
SOURCE: NORMAL
TRICHOMONAS VAGINALI, MOLECULAR: NEGATIVE

## 2022-09-03 ASSESSMENT — ENCOUNTER SYMPTOMS
COUGH: 0
CONSTIPATION: 0
EYE PAIN: 0
ABDOMINAL PAIN: 0
ABDOMINAL DISTENTION: 0
EYE ITCHING: 0
SINUS PAIN: 0
SHORTNESS OF BREATH: 0
DIARRHEA: 0
NAUSEA: 0
SINUS PRESSURE: 0
SORE THROAT: 0

## 2022-09-03 NOTE — ED PROVIDER NOTES
[facility]     Emergency Department     Faculty Note/ Attestation      Pt Name: Tapan Sanders                                       MRN: [id number]  [place] 1995  Date of evaluation: 9/2/2022    Patients PCP:    No primary care provider on file. Attestation  I performed a history and physical examination of the patient and discussed management with the resident. I reviewed the residents note and agree with the documented findings and plan of care. Any areas of disagreement are noted on the chart. I was personally present for the key portions of any procedures. I have documented in the chart those procedures where I was not present during the key portions. I have reviewed the emergency nurses triage note. I agree with the chief complaint, past medical history, past surgical history, allergies, medications, social and family history as documented unless otherwise noted below. For Physician Assistant/ Nurse Practitioner cases/documentation I have personally evaluated this patient and have completed at least one if not all key elements of the E/M (history, physical exam, and MDM). Additional findings are as noted.     Initial Screens:             Vitals:    Vitals:    09/02/22 1954   BP: 122/76   Pulse: 70   Resp: 17   Temp: 98.2 °F (36.8 °C)   TempSrc: Oral   SpO2: 97%       CHIEF COMPLAINT       Chief Complaint   Patient presents with    Other     Pt stated his dog urinated in his lap yesterday and now has discomfort in genital area    Dysuria       Is a 49-year-old male heterosexual who notes recent sexual contact concerned about STD patient also notes that he is concerned that when his dog peed on him in the car that this could also have caused the burning pain discomfort with urination patient has no fevers chills no redness no burning of the skin no irritation of the skin        EMERGENCY DEPARTMENT COURSE:     -------------------------  BP: 122/76, Temp: 98.2 °F (36.8 °C), Heart Rate: 70, Resp: 17  Patient is ambulatory no signs of shortness of breath difficulty breathing chest pain    Comments  Patient arrived hemodynamically stable and in no acute distress. Concern for STD given patient's presentation. No evidence of systemic disease. Patient requests treatment at this time and was be treated with Ceftriaxone and Doxycycline     Importance of informing partners discussed at length. Patient was instructed to seek care for future STD concerns at the [facility]. Reasons to return to the ED, potential complications, expected course of the patient's condition, and recommended treatments were discussed with the patient. The patient was also instructed to follow up with their primary care physician and/or any appropriate specialists. The patient expressed understanding and agreement, and will be discharged in good condition. ED Course as of 09/02/22 2136   Fri Sep 02, 2022   2034 Normal  exam w/chaperone. No ulcers, rashes or testicular tenderness [LR]   2117 Leukocyte Esterase, Urine: NEGATIVE [LR]   2118 Nitrite, Urine: NEGATIVE [LR]      ED Course User Index  [LR] DO Vilma Bradley DO,, DO, RDMS.   Attending Emergency Physician         Vilma Walker DO  09/02/22 2136
